# Patient Record
Sex: MALE | Race: WHITE | Employment: OTHER | URBAN - METROPOLITAN AREA
[De-identification: names, ages, dates, MRNs, and addresses within clinical notes are randomized per-mention and may not be internally consistent; named-entity substitution may affect disease eponyms.]

---

## 2018-12-25 ENCOUNTER — APPOINTMENT (OUTPATIENT)
Dept: GENERAL RADIOLOGY | Age: 58
DRG: 246 | End: 2018-12-25
Payer: MEDICARE

## 2018-12-25 ENCOUNTER — HOSPITAL ENCOUNTER (INPATIENT)
Age: 58
LOS: 1 days | Discharge: HOME | DRG: 246 | End: 2018-12-27
Attending: EMERGENCY MEDICINE | Admitting: INTERNAL MEDICINE
Payer: MEDICARE

## 2018-12-25 DIAGNOSIS — I10 ESSENTIAL HYPERTENSION: ICD-10-CM

## 2018-12-25 DIAGNOSIS — I21.4 NSTEMI (NON-ST ELEVATED MYOCARDIAL INFARCTION) (HCC): Primary | ICD-10-CM

## 2018-12-25 PROCEDURE — 83735 ASSAY OF MAGNESIUM: CPT

## 2018-12-25 PROCEDURE — 99285 EMERGENCY DEPT VISIT HI MDM: CPT

## 2018-12-25 PROCEDURE — 96374 THER/PROPH/DIAG INJ IV PUSH: CPT

## 2018-12-25 PROCEDURE — 80053 COMPREHEN METABOLIC PANEL: CPT

## 2018-12-25 PROCEDURE — 93005 ELECTROCARDIOGRAM TRACING: CPT | Performed by: EMERGENCY MEDICINE

## 2018-12-25 PROCEDURE — 71045 X-RAY EXAM CHEST 1 VIEW: CPT

## 2018-12-25 PROCEDURE — 6360000002 HC RX W HCPCS: Performed by: EMERGENCY MEDICINE

## 2018-12-25 PROCEDURE — 84484 ASSAY OF TROPONIN QUANT: CPT

## 2018-12-25 RX ORDER — HEPARIN SODIUM 10000 [USP'U]/100ML
INJECTION, SOLUTION INTRAVENOUS
Status: DISPENSED
Start: 2018-12-25 | End: 2018-12-26

## 2018-12-25 RX ORDER — HEPARIN SODIUM 1000 [USP'U]/ML
4000 INJECTION, SOLUTION INTRAVENOUS; SUBCUTANEOUS ONCE
Status: COMPLETED | OUTPATIENT
Start: 2018-12-25 | End: 2018-12-25

## 2018-12-25 RX ORDER — HEPARIN SODIUM 1000 [USP'U]/ML
4000 INJECTION, SOLUTION INTRAVENOUS; SUBCUTANEOUS PRN
Status: DISCONTINUED | OUTPATIENT
Start: 2018-12-25 | End: 2018-12-26

## 2018-12-25 RX ORDER — HEPARIN SODIUM 1000 [USP'U]/ML
INJECTION, SOLUTION INTRAVENOUS; SUBCUTANEOUS
Status: DISPENSED
Start: 2018-12-25 | End: 2018-12-26

## 2018-12-25 RX ORDER — HEPARIN SODIUM 1000 [USP'U]/ML
2000 INJECTION, SOLUTION INTRAVENOUS; SUBCUTANEOUS PRN
Status: DISCONTINUED | OUTPATIENT
Start: 2018-12-25 | End: 2018-12-26

## 2018-12-25 RX ORDER — HEPARIN SODIUM 10000 [USP'U]/100ML
12 INJECTION, SOLUTION INTRAVENOUS CONTINUOUS
Status: DISCONTINUED | OUTPATIENT
Start: 2018-12-25 | End: 2018-12-26

## 2018-12-25 RX ADMIN — HEPARIN SODIUM 4000 UNITS: 1000 INJECTION INTRAVENOUS; SUBCUTANEOUS at 23:51

## 2018-12-25 RX ADMIN — HEPARIN SODIUM AND DEXTROSE 12 UNITS/KG/HR: 10000; 5 INJECTION INTRAVENOUS at 23:50

## 2018-12-25 ASSESSMENT — PAIN SCALES - GENERAL: PAINLEVEL_OUTOF10: 8

## 2018-12-25 ASSESSMENT — PAIN DESCRIPTION - LOCATION: LOCATION: CHEST

## 2018-12-26 PROBLEM — I21.3 STEMI (ST ELEVATION MYOCARDIAL INFARCTION) (HCC): Status: ACTIVE | Noted: 2018-12-26

## 2018-12-26 LAB
A/G RATIO: 1.6 (ref 1.1–2.2)
ALBUMIN SERPL-MCNC: 4.1 G/DL (ref 3.4–5)
ALP BLD-CCNC: 115 U/L (ref 40–129)
ALT SERPL-CCNC: 67 U/L (ref 10–40)
ANION GAP SERPL CALCULATED.3IONS-SCNC: 11 MMOL/L (ref 3–16)
ANION GAP SERPL CALCULATED.3IONS-SCNC: 19 MMOL/L (ref 3–16)
AST SERPL-CCNC: 86 U/L (ref 15–37)
BILIRUB SERPL-MCNC: <0.2 MG/DL (ref 0–1)
BUN BLDV-MCNC: 7 MG/DL (ref 7–20)
BUN BLDV-MCNC: 7 MG/DL (ref 7–20)
CALCIUM SERPL-MCNC: 8.4 MG/DL (ref 8.3–10.6)
CALCIUM SERPL-MCNC: 8.7 MG/DL (ref 8.3–10.6)
CHLORIDE BLD-SCNC: 100 MMOL/L (ref 99–110)
CHLORIDE BLD-SCNC: 101 MMOL/L (ref 99–110)
CO2: 15 MMOL/L (ref 21–32)
CO2: 21 MMOL/L (ref 21–32)
CREAT SERPL-MCNC: 0.8 MG/DL (ref 0.8–1.3)
CREAT SERPL-MCNC: 0.9 MG/DL (ref 0.8–1.3)
GFR AFRICAN AMERICAN: >60
GFR AFRICAN AMERICAN: >60
GFR NON-AFRICAN AMERICAN: >60
GFR NON-AFRICAN AMERICAN: >60
GLOBULIN: 2.5 G/DL
GLUCOSE BLD-MCNC: 174 MG/DL (ref 70–99)
GLUCOSE BLD-MCNC: 176 MG/DL (ref 70–99)
HCT VFR BLD CALC: 37.8 % (ref 40.5–52.5)
HEMOGLOBIN: 12.6 G/DL (ref 13.5–17.5)
LEFT VENTRICULAR EJECTION FRACTION HIGH VALUE: 60 %
LEFT VENTRICULAR EJECTION FRACTION MODE: NORMAL
LEFT VENTRICULAR EJECTION FRACTION MODE: NORMAL
LV EF: 50 %
LV EF: 50 %
LV EF: 55 %
LV EF: 58 %
LVEF MODALITY: NORMAL
LVEF MODALITY: NORMAL
MAGNESIUM: 2 MG/DL (ref 1.8–2.4)
MCH RBC QN AUTO: 31.2 PG (ref 26–34)
MCHC RBC AUTO-ENTMCNC: 33.3 G/DL (ref 31–36)
MCV RBC AUTO: 93.9 FL (ref 80–100)
PDW BLD-RTO: 14 % (ref 12.4–15.4)
PLATELET # BLD: 280 K/UL (ref 135–450)
PMV BLD AUTO: 8.8 FL (ref 5–10.5)
POC ACT LR: 196 SEC
POC ACT LR: 314 SEC
POTASSIUM REFLEX MAGNESIUM: 2.8 MMOL/L (ref 3.5–5.1)
POTASSIUM SERPL-SCNC: 5.9 MMOL/L (ref 3.5–5.1)
RBC # BLD: 4.02 M/UL (ref 4.2–5.9)
SODIUM BLD-SCNC: 132 MMOL/L (ref 136–145)
SODIUM BLD-SCNC: 135 MMOL/L (ref 136–145)
TOTAL PROTEIN: 6.6 G/DL (ref 6.4–8.2)
TROPONIN: 0.89 NG/ML
TROPONIN: <0.01 NG/ML
WBC # BLD: 14 K/UL (ref 4–11)

## 2018-12-26 PROCEDURE — 93458 L HRT ARTERY/VENTRICLE ANGIO: CPT

## 2018-12-26 PROCEDURE — 2709999900 HC NON-CHARGEABLE SUPPLY

## 2018-12-26 PROCEDURE — 85027 COMPLETE CBC AUTOMATED: CPT

## 2018-12-26 PROCEDURE — 6360000002 HC RX W HCPCS

## 2018-12-26 PROCEDURE — 2580000003 HC RX 258: Performed by: INTERNAL MEDICINE

## 2018-12-26 PROCEDURE — C1725 CATH, TRANSLUMIN NON-LASER: HCPCS

## 2018-12-26 PROCEDURE — 99153 MOD SED SAME PHYS/QHP EA: CPT

## 2018-12-26 PROCEDURE — 94760 N-INVAS EAR/PLS OXIMETRY 1: CPT

## 2018-12-26 PROCEDURE — C1887 CATHETER, GUIDING: HCPCS

## 2018-12-26 PROCEDURE — C1769 GUIDE WIRE: HCPCS

## 2018-12-26 PROCEDURE — 6370000000 HC RX 637 (ALT 250 FOR IP)

## 2018-12-26 PROCEDURE — 80048 BASIC METABOLIC PNL TOTAL CA: CPT

## 2018-12-26 PROCEDURE — 2000000000 HC ICU R&B

## 2018-12-26 PROCEDURE — 93306 TTE W/DOPPLER COMPLETE: CPT

## 2018-12-26 PROCEDURE — 99152 MOD SED SAME PHYS/QHP 5/>YRS: CPT

## 2018-12-26 PROCEDURE — 99152 MOD SED SAME PHYS/QHP 5/>YRS: CPT | Performed by: INTERNAL MEDICINE

## 2018-12-26 PROCEDURE — 92941 PRQ TRLML REVSC TOT OCCL AMI: CPT

## 2018-12-26 PROCEDURE — B2111ZZ FLUOROSCOPY OF MULTIPLE CORONARY ARTERIES USING LOW OSMOLAR CONTRAST: ICD-10-PCS | Performed by: INTERNAL MEDICINE

## 2018-12-26 PROCEDURE — 6360000002 HC RX W HCPCS: Performed by: INTERNAL MEDICINE

## 2018-12-26 PROCEDURE — 4A023N7 MEASUREMENT OF CARDIAC SAMPLING AND PRESSURE, LEFT HEART, PERCUTANEOUS APPROACH: ICD-10-PCS | Performed by: INTERNAL MEDICINE

## 2018-12-26 PROCEDURE — 027034Z DILATION OF CORONARY ARTERY, ONE ARTERY WITH DRUG-ELUTING INTRALUMINAL DEVICE, PERCUTANEOUS APPROACH: ICD-10-PCS | Performed by: INTERNAL MEDICINE

## 2018-12-26 PROCEDURE — C1894 INTRO/SHEATH, NON-LASER: HCPCS

## 2018-12-26 PROCEDURE — 2500000003 HC RX 250 WO HCPCS

## 2018-12-26 PROCEDURE — 93458 L HRT ARTERY/VENTRICLE ANGIO: CPT | Performed by: INTERNAL MEDICINE

## 2018-12-26 PROCEDURE — 84484 ASSAY OF TROPONIN QUANT: CPT

## 2018-12-26 PROCEDURE — 36415 COLL VENOUS BLD VENIPUNCTURE: CPT

## 2018-12-26 PROCEDURE — 85347 COAGULATION TIME ACTIVATED: CPT

## 2018-12-26 PROCEDURE — 92941 PRQ TRLML REVSC TOT OCCL AMI: CPT | Performed by: INTERNAL MEDICINE

## 2018-12-26 PROCEDURE — 99291 CRITICAL CARE FIRST HOUR: CPT | Performed by: INTERNAL MEDICINE

## 2018-12-26 PROCEDURE — B2150ZZ FLUOROSCOPY OF LEFT HEART USING HIGH OSMOLAR CONTRAST: ICD-10-PCS | Performed by: INTERNAL MEDICINE

## 2018-12-26 PROCEDURE — C1874 STENT, COATED/COV W/DEL SYS: HCPCS

## 2018-12-26 PROCEDURE — 6370000000 HC RX 637 (ALT 250 FOR IP): Performed by: INTERNAL MEDICINE

## 2018-12-26 PROCEDURE — 6360000004 HC RX CONTRAST MEDICATION: Performed by: INTERNAL MEDICINE

## 2018-12-26 RX ORDER — POTASSIUM CHLORIDE 7.45 MG/ML
INJECTION INTRAVENOUS
Status: COMPLETED
Start: 2018-12-26 | End: 2018-12-26

## 2018-12-26 RX ORDER — MORPHINE SULFATE 2 MG/ML
2 INJECTION, SOLUTION INTRAMUSCULAR; INTRAVENOUS ONCE
Status: COMPLETED | OUTPATIENT
Start: 2018-12-26 | End: 2018-12-26

## 2018-12-26 RX ORDER — MORPHINE SULFATE 2 MG/ML
INJECTION, SOLUTION INTRAMUSCULAR; INTRAVENOUS
Status: COMPLETED
Start: 2018-12-26 | End: 2018-12-26

## 2018-12-26 RX ORDER — ASPIRIN 81 MG/1
81 TABLET, CHEWABLE ORAL DAILY
Status: DISCONTINUED | OUTPATIENT
Start: 2018-12-27 | End: 2018-12-27 | Stop reason: HOSPADM

## 2018-12-26 RX ORDER — SODIUM CHLORIDE 0.9 % (FLUSH) 0.9 %
SYRINGE (ML) INJECTION
Status: DISCONTINUED
Start: 2018-12-26 | End: 2018-12-26 | Stop reason: WASHOUT

## 2018-12-26 RX ORDER — POTASSIUM CHLORIDE 20 MEQ/1
40 TABLET, EXTENDED RELEASE ORAL 2 TIMES DAILY WITH MEALS
Status: DISCONTINUED | OUTPATIENT
Start: 2018-12-26 | End: 2018-12-26

## 2018-12-26 RX ORDER — SODIUM CHLORIDE 0.9 % (FLUSH) 0.9 %
10 SYRINGE (ML) INJECTION PRN
Status: DISCONTINUED | OUTPATIENT
Start: 2018-12-26 | End: 2018-12-27 | Stop reason: HOSPADM

## 2018-12-26 RX ORDER — POTASSIUM CHLORIDE 7.45 MG/ML
10 INJECTION INTRAVENOUS ONCE
Status: COMPLETED | OUTPATIENT
Start: 2018-12-26 | End: 2018-12-26

## 2018-12-26 RX ORDER — LORAZEPAM 2 MG/ML
2 INJECTION INTRAMUSCULAR ONCE
Status: DISCONTINUED | OUTPATIENT
Start: 2018-12-26 | End: 2018-12-27 | Stop reason: HOSPADM

## 2018-12-26 RX ORDER — SODIUM CHLORIDE 0.9 % (FLUSH) 0.9 %
10 SYRINGE (ML) INJECTION EVERY 12 HOURS SCHEDULED
Status: DISCONTINUED | OUTPATIENT
Start: 2018-12-26 | End: 2018-12-27 | Stop reason: HOSPADM

## 2018-12-26 RX ORDER — KETOROLAC TROMETHAMINE 30 MG/ML
30 INJECTION, SOLUTION INTRAMUSCULAR; INTRAVENOUS EVERY 6 HOURS PRN
Status: DISCONTINUED | OUTPATIENT
Start: 2018-12-26 | End: 2018-12-27 | Stop reason: HOSPADM

## 2018-12-26 RX ORDER — ACETAMINOPHEN 325 MG/1
650 TABLET ORAL EVERY 4 HOURS PRN
Status: DISCONTINUED | OUTPATIENT
Start: 2018-12-26 | End: 2018-12-27 | Stop reason: HOSPADM

## 2018-12-26 RX ADMIN — METOPROLOL TARTRATE 25 MG: 25 TABLET, FILM COATED ORAL at 09:17

## 2018-12-26 RX ADMIN — ENOXAPARIN SODIUM 40 MG: 40 INJECTION SUBCUTANEOUS at 10:54

## 2018-12-26 RX ADMIN — KETOROLAC TROMETHAMINE 30 MG: 30 INJECTION, SOLUTION INTRAMUSCULAR at 10:54

## 2018-12-26 RX ADMIN — IOPAMIDOL 153 ML: 755 INJECTION, SOLUTION INTRAVENOUS at 01:10

## 2018-12-26 RX ADMIN — ACETAMINOPHEN 650 MG: 325 TABLET, FILM COATED ORAL at 02:22

## 2018-12-26 RX ADMIN — TICAGRELOR 90 MG: 90 TABLET ORAL at 20:23

## 2018-12-26 RX ADMIN — POTASSIUM CHLORIDE 10 MEQ: 7.45 INJECTION INTRAVENOUS at 00:15

## 2018-12-26 RX ADMIN — MAGNESIUM HYDROXIDE 30 ML: 400 SUSPENSION ORAL at 09:14

## 2018-12-26 RX ADMIN — Medication 10 ML: at 20:23

## 2018-12-26 RX ADMIN — MORPHINE SULFATE 2 MG: 2 INJECTION, SOLUTION INTRAMUSCULAR; INTRAVENOUS at 02:50

## 2018-12-26 RX ADMIN — KETOROLAC TROMETHAMINE 30 MG: 30 INJECTION, SOLUTION INTRAMUSCULAR at 23:49

## 2018-12-26 RX ADMIN — METOPROLOL TARTRATE 25 MG: 25 TABLET, FILM COATED ORAL at 20:23

## 2018-12-26 RX ADMIN — Medication 10 ML: at 09:16

## 2018-12-26 RX ADMIN — KETOROLAC TROMETHAMINE 30 MG: 30 INJECTION, SOLUTION INTRAMUSCULAR at 17:51

## 2018-12-26 RX ADMIN — TICAGRELOR 90 MG: 90 TABLET ORAL at 09:17

## 2018-12-26 RX ADMIN — Medication 10 MEQ: at 00:15

## 2018-12-26 RX ADMIN — TICAGRELOR 90 MG: 90 TABLET ORAL at 01:58

## 2018-12-26 ASSESSMENT — PAIN SCALES - GENERAL
PAINLEVEL_OUTOF10: 7
PAINLEVEL_OUTOF10: 5
PAINLEVEL_OUTOF10: 6
PAINLEVEL_OUTOF10: 6
PAINLEVEL_OUTOF10: 3
PAINLEVEL_OUTOF10: 0
PAINLEVEL_OUTOF10: 6
PAINLEVEL_OUTOF10: 0
PAINLEVEL_OUTOF10: 8
PAINLEVEL_OUTOF10: 2
PAINLEVEL_OUTOF10: 0
PAINLEVEL_OUTOF10: 7

## 2018-12-26 ASSESSMENT — PAIN DESCRIPTION - LOCATION
LOCATION: STERNUM
LOCATION: RIB CAGE

## 2018-12-26 ASSESSMENT — PAIN DESCRIPTION - PAIN TYPE
TYPE: ACUTE PAIN
TYPE: ACUTE PAIN

## 2018-12-26 NOTE — CONSULTS
cath    [facility] [id number]    Thank you for allowing us to participate in the care of [facility]. Please call me with any questions [phone].     Jo Barnett MD, [facility]   Interventional Cardiologist  [facility]  [phone] [facility]  [phone] [address] [place] [address]  2018 1:15 AM

## 2018-12-27 VITALS
BODY MASS INDEX: 25.12 KG/M2 | HEIGHT: 67 IN | TEMPERATURE: 97.9 F | DIASTOLIC BLOOD PRESSURE: 65 MMHG | HEART RATE: 72 BPM | WEIGHT: 160.05 LBS | RESPIRATION RATE: 20 BRPM | SYSTOLIC BLOOD PRESSURE: 121 MMHG | OXYGEN SATURATION: 98 %

## 2018-12-27 LAB
ANION GAP SERPL CALCULATED.3IONS-SCNC: 11 MMOL/L (ref 3–16)
BUN BLDV-MCNC: 8 MG/DL (ref 7–20)
CALCIUM SERPL-MCNC: 8.4 MG/DL (ref 8.3–10.6)
CHLORIDE BLD-SCNC: 102 MMOL/L (ref 99–110)
CO2: 25 MMOL/L (ref 21–32)
CREAT SERPL-MCNC: 1 MG/DL (ref 0.8–1.3)
EKG ATRIAL RATE: 93 BPM
EKG DIAGNOSIS: NORMAL
EKG Q-T INTERVAL: 406 MS
EKG QRS DURATION: 134 MS
EKG QTC CALCULATION (BAZETT): 468 MS
EKG R AXIS: 78 DEGREES
EKG T AXIS: 2 DEGREES
EKG VENTRICULAR RATE: 80 BPM
GFR AFRICAN AMERICAN: >60
GFR NON-AFRICAN AMERICAN: >60
GLUCOSE BLD-MCNC: 103 MG/DL (ref 70–99)
POTASSIUM SERPL-SCNC: 3.8 MMOL/L (ref 3.5–5.1)
SODIUM BLD-SCNC: 138 MMOL/L (ref 136–145)

## 2018-12-27 PROCEDURE — 2580000003 HC RX 258: Performed by: INTERNAL MEDICINE

## 2018-12-27 PROCEDURE — 80048 BASIC METABOLIC PNL TOTAL CA: CPT

## 2018-12-27 PROCEDURE — 99232 SBSQ HOSP IP/OBS MODERATE 35: CPT | Performed by: INTERNAL MEDICINE

## 2018-12-27 PROCEDURE — 6370000000 HC RX 637 (ALT 250 FOR IP): Performed by: INTERNAL MEDICINE

## 2018-12-27 PROCEDURE — 6360000002 HC RX W HCPCS: Performed by: INTERNAL MEDICINE

## 2018-12-27 RX ORDER — ATORVASTATIN CALCIUM 80 MG/1
80 TABLET, FILM COATED ORAL NIGHTLY
Status: DISCONTINUED | OUTPATIENT
Start: 2018-12-27 | End: 2018-12-27

## 2018-12-27 RX ORDER — POTASSIUM CHLORIDE 20 MEQ/1
40 TABLET, EXTENDED RELEASE ORAL ONCE
Status: COMPLETED | OUTPATIENT
Start: 2018-12-27 | End: 2018-12-27

## 2018-12-27 RX ORDER — ATORVASTATIN CALCIUM 80 MG/1
80 TABLET, FILM COATED ORAL NIGHTLY
Status: DISCONTINUED | OUTPATIENT
Start: 2018-12-27 | End: 2018-12-27 | Stop reason: HOSPADM

## 2018-12-27 RX ORDER — ROSUVASTATIN CALCIUM 10 MG/1
20 TABLET, COATED ORAL NIGHTLY
Status: DISCONTINUED | OUTPATIENT
Start: 2018-12-27 | End: 2018-12-27

## 2018-12-27 RX ORDER — ATORVASTATIN CALCIUM 80 MG/1
80 TABLET, FILM COATED ORAL NIGHTLY
Qty: 90 TABLET | Refills: 3 | Status: SHIPPED | OUTPATIENT
Start: 2018-12-27 | End: 2019-01-04 | Stop reason: SINTOL

## 2018-12-27 RX ORDER — ACETAMINOPHEN 80 MG
TABLET,CHEWABLE ORAL
Status: DISCONTINUED
Start: 2018-12-27 | End: 2018-12-27 | Stop reason: HOSPADM

## 2018-12-27 RX ORDER — LOSARTAN POTASSIUM 25 MG/1
25 TABLET ORAL DAILY
Qty: 90 TABLET | Refills: 3 | Status: SHIPPED | OUTPATIENT
Start: 2018-12-27 | End: 2020-01-03

## 2018-12-27 RX ORDER — LOSARTAN POTASSIUM 25 MG/1
25 TABLET ORAL DAILY
Status: DISCONTINUED | OUTPATIENT
Start: 2018-12-27 | End: 2018-12-27 | Stop reason: HOSPADM

## 2018-12-27 RX ORDER — ASPIRIN 81 MG/1
81 TABLET, CHEWABLE ORAL DAILY
Qty: 90 TABLET | Refills: 3 | Status: SHIPPED | OUTPATIENT
Start: 2018-12-28

## 2018-12-27 RX ORDER — LISINOPRIL 5 MG/1
2.5 TABLET ORAL DAILY
Status: DISCONTINUED | OUTPATIENT
Start: 2018-12-27 | End: 2018-12-27

## 2018-12-27 RX ADMIN — POTASSIUM CHLORIDE 40 MEQ: 1500 TABLET, EXTENDED RELEASE ORAL at 09:30

## 2018-12-27 RX ADMIN — LOSARTAN POTASSIUM 25 MG: 25 TABLET, FILM COATED ORAL at 09:30

## 2018-12-27 RX ADMIN — Medication 10 ML: at 05:20

## 2018-12-27 RX ADMIN — KETOROLAC TROMETHAMINE 30 MG: 30 INJECTION, SOLUTION INTRAMUSCULAR at 05:20

## 2018-12-27 RX ADMIN — TICAGRELOR 90 MG: 90 TABLET ORAL at 08:10

## 2018-12-27 RX ADMIN — Medication 10 ML: at 08:11

## 2018-12-27 RX ADMIN — ASPIRIN 81 MG 81 MG: 81 TABLET ORAL at 08:19

## 2018-12-27 RX ADMIN — METOPROLOL TARTRATE 25 MG: 25 TABLET, FILM COATED ORAL at 08:10

## 2018-12-27 ASSESSMENT — PAIN SCALES - GENERAL
PAINLEVEL_OUTOF10: 7
PAINLEVEL_OUTOF10: 0
PAINLEVEL_OUTOF10: 2
PAINLEVEL_OUTOF10: 0

## 2018-12-27 NOTE — PROGRESS NOTES
2 cc of air removed
CLINICAL PHARMACY NOTE: MEDS TO [address] Select Patient?: No  Total # of Prescriptions Filled: 5   The following medications were delivered to the patient:  · Aspirin 81 chewable  · Atorvastatin 80  · Losartan 25  · Metoprolol tartrate 25  · Brilinta 90  Total # of Interventions Completed: 3  Time Spent (min): 60    Additional Documentation:  Spoke with nurse--fill 15 day script Brilinta, give back 90 day supply at delivery. Change in qty for Brilinta. Spoke with patient--okay to take atorvastatin despite allergy on file.  Patient signed for prescriptions
Discharge instructions reviewed with patient and family member. Patient and family verbalized understanding. All home medications have been reviewed, questions answered and patient voiced understanding. All medication side effects reviewed and patient and family verbalized understanding. Patient given prescriptions, discharge instructions, and appointment times. Patient discharged to home with family via private car. Taken to lobby via wheelchair. Follow up appointment(s) reviewed with patient and all attempts made to schedule within 7 days of discharge. [facility]  Depression Screen    1. During the past month, have you often been bothered by feeling down, depressed, or hopeless? NO    2. During the past month, have you often been bothered by little interest or pleasure in       doing things?    NO
H/p dictation id [id number]. Date of service 12/26/18. Stemi. Htn. Tobacco dependence.
PRN Lew Hines MD        metoprolol tartrate (LOPRESSOR) tablet 25 mg  25 mg Oral BID Lew Hines MD        [START ON 12/27/2018] aspirin chewable tablet 81 mg  81 mg Oral Daily Lew Hines MD        [facility]) tablet 90 mg  90 mg Oral BID Lew Hines MD   90 mg at 12/26/18 0158    [START ON 12/27/2018] influenza quadrivalent split vaccine (FLUZONE;FLUARIX;FLULAVAL;AFLURIA) injection 0.5 mL  0.5 mL Intramuscular Once Lew Hines MD        heparin (porcine) injection 4,000 Units  4,000 Units Intravenous PRN Vasu Cheng MD        heparin (porcine) injection 2,000 Units  2,000 Units Intravenous PRN Vasu Cheng MD        heparin 25,000 units in dextrose 5% 250 mL infusion  12 Units/kg/hr Intravenous Continuous Vasu Cheng MD 9.3 mL/hr at 12/25/18 2350 12 Units/kg/hr at 12/25/18 2350    amiodarone (CORDARONE) 450 mg in dextrose 5 % 250 mL infusion  1 mg/min Intravenous Continuous Vasu Cheng MD               Pre-Sedation:    Pre-Sedation Documentation and Exam:  I have personally completed a history, physical exam & review of systems for this patient (see notes). Prior History of Anesthesia Complications:   none    Modified Mallampati:  II (soft palate, uvula, fauces visible)    ASA Classification:  Class 4 - A patient with an incapacitating systemic disease that is a constant threat to life and Class 2 -- A normal healthy patient with mild systemic disease    Mylene Scale: Activity:  1 - Able to move 2 extremities voluntarily on command  Respiration:  1-dyspneic  Circulation:  2 - BP+/- 20mmHg of normal  Consciousness:  1 - Arousable on calling  Oxygen Saturation (color):  1 - Needs oxygen to maintain oxygen saturation >90%    Sedation/Anesthesia Plan:  Guard the patient's safety and welfare. Minimize physical discomfort and pain.   Minimize negative psychological responses to treatment by providing sedation and analgesia and maximize the potential

## 2018-12-27 NOTE — DISCHARGE INSTR - OTHER ORDERS
pressure for 10 min  · Feeling lightheaded or dizzy  · Increased swelling or bruising of the groin or leg  · Unusual pain, numbness or tingling at the hand or down the arm  · Any signs of infection ( redness, yellow drainage, swelling, pain, fever)  · Unusual swelling of lower legs/feet, chest discomfort, unusual weakness or fatigue

## 2018-12-27 NOTE — H&P
[facility]                     [address] [facility], [address]                              HISTORY AND PHYSICAL    PATIENT NAME: Ursula Pereyra                   :        1960  MED REC NO:   [id number]                          ROOM:       2909  ACCOUNT NO:   [de-identified]                           ADMIT DATE: 2018  PROVIDER:     Jake Maria MD    DATE OF SERVICE:  I obtained the history and performed a physical exam  on the patient in the CVICU on 2018. CHIEF COMPLAINT:  Chest pain. HISTORY OF PRESENT ILLNESS:  The patient is a 78-year-old   female presenting to the hospital with chief complaints of sudden onset  of chest pain that started when the patient was having sex, midway  through his having sex he started having severe crushing substernal  chest pain for which he called EMS and he became unresponsive and was  found to be in ventricular fibrillation for which CPR was started, he  was given defibrillation and amiodarone and brought to the hospital.  In  the hospital, the patient underwent an emergency coronary angiography  with stenting of the RCA. The interventional cardiologist Colette Ramírez  requested an admission to the hospitalist service as  the primary admitting service. The patient at the time of my exam still  is complaining of some soreness on his chest because of primarily the  CPR. PAST MEDICAL/PAST SURGICAL HISTORY:  1. Hypertension. 2.  The patient has had multiple cerebrovascular accidents in the past  according to him which has left him with some problems with speech. ALLERGIC HISTORY:  [place] Query. FAMILY HISTORY:  Reviewed by me and is currently noncontributory. SOCIAL HISTORY:  Lives at home. Smokes cigarettes. No illicit  substance use. MEDICATIONS:  The patient's home medication list was reviewed by me. The patient's home medications include Norvasc, Prozac and Maxzide.     REVIEW

## 2019-01-04 ENCOUNTER — OFFICE VISIT (OUTPATIENT)
Dept: CARDIOLOGY CLINIC | Age: 59
End: 2019-01-04
Payer: MEDICARE

## 2019-01-04 VITALS
BODY MASS INDEX: 24.86 KG/M2 | DIASTOLIC BLOOD PRESSURE: 70 MMHG | HEART RATE: 64 BPM | WEIGHT: 164 LBS | SYSTOLIC BLOOD PRESSURE: 136 MMHG | HEIGHT: 68 IN

## 2019-01-04 DIAGNOSIS — I10 ESSENTIAL HYPERTENSION: ICD-10-CM

## 2019-01-04 DIAGNOSIS — F17.200 SMOKING: ICD-10-CM

## 2019-01-04 DIAGNOSIS — E78.2 MIXED HYPERLIPIDEMIA: ICD-10-CM

## 2019-01-04 DIAGNOSIS — I25.10 CORONARY ARTERY DISEASE DUE TO LIPID RICH PLAQUE: Primary | ICD-10-CM

## 2019-01-04 DIAGNOSIS — I25.83 CORONARY ARTERY DISEASE DUE TO LIPID RICH PLAQUE: Primary | ICD-10-CM

## 2019-01-04 PROCEDURE — 99214 OFFICE O/P EST MOD 30 MIN: CPT | Performed by: NURSE PRACTITIONER

## 2019-01-04 RX ORDER — ROSUVASTATIN CALCIUM 10 MG/1
10 TABLET, COATED ORAL DAILY
Qty: 90 TABLET | Refills: 5 | Status: SHIPPED | OUTPATIENT
Start: 2019-01-04 | End: 2020-01-31 | Stop reason: SINTOL

## 2019-01-04 RX ORDER — ROSUVASTATIN CALCIUM 10 MG/1
10 TABLET, COATED ORAL DAILY
Qty: 30 TABLET | Refills: 5 | Status: SHIPPED | OUTPATIENT
Start: 2019-01-04 | End: 2019-01-04 | Stop reason: SDUPTHER

## 2019-01-14 ENCOUNTER — HOSPITAL ENCOUNTER (OUTPATIENT)
Dept: NON INVASIVE DIAGNOSTICS | Age: 59
Discharge: HOME | End: 2019-01-14
Payer: MEDICARE

## 2019-01-14 DIAGNOSIS — I25.10 CORONARY ARTERY DISEASE DUE TO LIPID RICH PLAQUE: ICD-10-CM

## 2019-01-14 DIAGNOSIS — I25.83 CORONARY ARTERY DISEASE DUE TO LIPID RICH PLAQUE: ICD-10-CM

## 2019-01-14 PROCEDURE — 93017 CV STRESS TEST TRACING ONLY: CPT | Performed by: INTERNAL MEDICINE

## 2019-01-21 ENCOUNTER — HOSPITAL ENCOUNTER (OUTPATIENT)
Age: 59
Discharge: HOME | End: 2019-01-21
Payer: MEDICARE

## 2019-01-21 DIAGNOSIS — I25.83 CORONARY ARTERY DISEASE DUE TO LIPID RICH PLAQUE: ICD-10-CM

## 2019-01-21 DIAGNOSIS — I25.10 CORONARY ARTERY DISEASE DUE TO LIPID RICH PLAQUE: ICD-10-CM

## 2019-01-21 LAB
ANION GAP SERPL CALCULATED.3IONS-SCNC: 14 MMOL/L (ref 3–16)
BUN BLDV-MCNC: 12 MG/DL (ref 7–20)
CALCIUM SERPL-MCNC: 9.5 MG/DL (ref 8.3–10.6)
CHLORIDE BLD-SCNC: 105 MMOL/L (ref 99–110)
CO2: 22 MMOL/L (ref 21–32)
CREAT SERPL-MCNC: 0.9 MG/DL (ref 0.8–1.3)
GFR AFRICAN AMERICAN: >60
GFR NON-AFRICAN AMERICAN: >60
GLUCOSE BLD-MCNC: 116 MG/DL (ref 70–99)
POTASSIUM SERPL-SCNC: 5 MMOL/L (ref 3.5–5.1)
SODIUM BLD-SCNC: 141 MMOL/L (ref 136–145)

## 2019-01-21 PROCEDURE — 36415 COLL VENOUS BLD VENIPUNCTURE: CPT

## 2019-01-21 PROCEDURE — 80048 BASIC METABOLIC PNL TOTAL CA: CPT

## 2019-01-22 ENCOUNTER — TELEPHONE (OUTPATIENT)
Dept: CARDIOLOGY CLINIC | Age: 59
End: 2019-01-22

## 2019-01-29 ENCOUNTER — HOSPITAL ENCOUNTER (OUTPATIENT)
Dept: CARDIAC REHAB | Age: 59
Setting detail: THERAPIES SERIES
Discharge: HOME | End: 2019-01-29
Payer: MEDICARE

## 2019-02-04 ENCOUNTER — HOSPITAL ENCOUNTER (OUTPATIENT)
Dept: CARDIAC REHAB | Age: 59
Setting detail: THERAPIES SERIES
Discharge: HOME | End: 2019-02-04
Payer: MEDICARE

## 2019-02-04 PROCEDURE — 93798 PHYS/QHP OP CAR RHAB W/ECG: CPT

## 2019-02-06 ENCOUNTER — HOSPITAL ENCOUNTER (OUTPATIENT)
Dept: CARDIAC REHAB | Age: 59
Setting detail: THERAPIES SERIES
End: 2019-02-06
Payer: MEDICARE

## 2019-02-08 ENCOUNTER — HOSPITAL ENCOUNTER (OUTPATIENT)
Dept: CARDIAC REHAB | Age: 59
Setting detail: THERAPIES SERIES
Discharge: HOME | End: 2019-02-08
Payer: MEDICARE

## 2019-02-08 PROCEDURE — 93798 PHYS/QHP OP CAR RHAB W/ECG: CPT

## 2019-02-11 ENCOUNTER — HOSPITAL ENCOUNTER (OUTPATIENT)
Dept: CARDIAC REHAB | Age: 59
Setting detail: THERAPIES SERIES
End: 2019-02-11
Payer: MEDICARE

## 2019-02-13 ENCOUNTER — HOSPITAL ENCOUNTER (OUTPATIENT)
Dept: CARDIAC REHAB | Age: 59
Setting detail: THERAPIES SERIES
Discharge: HOME | End: 2019-02-13
Payer: MEDICARE

## 2019-02-13 PROCEDURE — 93798 PHYS/QHP OP CAR RHAB W/ECG: CPT

## 2019-02-15 ENCOUNTER — HOSPITAL ENCOUNTER (OUTPATIENT)
Dept: CARDIAC REHAB | Age: 59
Setting detail: THERAPIES SERIES
End: 2019-02-15
Payer: MEDICARE

## 2019-02-18 ENCOUNTER — HOSPITAL ENCOUNTER (OUTPATIENT)
Dept: CARDIAC REHAB | Age: 59
Setting detail: THERAPIES SERIES
End: 2019-02-18
Payer: MEDICARE

## 2019-02-20 ENCOUNTER — HOSPITAL ENCOUNTER (OUTPATIENT)
Dept: CARDIAC REHAB | Age: 59
Setting detail: THERAPIES SERIES
Discharge: HOME | End: 2019-02-20
Payer: MEDICARE

## 2019-02-20 PROCEDURE — 93798 PHYS/QHP OP CAR RHAB W/ECG: CPT

## 2019-02-22 ENCOUNTER — HOSPITAL ENCOUNTER (OUTPATIENT)
Dept: CARDIAC REHAB | Age: 59
Setting detail: THERAPIES SERIES
Discharge: HOME | End: 2019-02-22
Payer: MEDICARE

## 2019-02-22 PROCEDURE — 93798 PHYS/QHP OP CAR RHAB W/ECG: CPT

## 2019-02-25 ENCOUNTER — HOSPITAL ENCOUNTER (OUTPATIENT)
Dept: CARDIAC REHAB | Age: 59
Setting detail: THERAPIES SERIES
End: 2019-02-25
Payer: MEDICARE

## 2019-02-26 ENCOUNTER — TELEPHONE (OUTPATIENT)
Dept: CARDIOLOGY CLINIC | Age: 59
End: 2019-02-26

## 2019-02-27 ENCOUNTER — HOSPITAL ENCOUNTER (OUTPATIENT)
Dept: CARDIAC REHAB | Age: 59
Setting detail: THERAPIES SERIES
Discharge: HOME | End: 2019-02-27
Payer: MEDICARE

## 2019-02-27 PROCEDURE — 93798 PHYS/QHP OP CAR RHAB W/ECG: CPT

## 2019-03-01 ENCOUNTER — HOSPITAL ENCOUNTER (OUTPATIENT)
Dept: CARDIAC REHAB | Age: 59
Setting detail: THERAPIES SERIES
Discharge: HOME | End: 2019-03-01
Payer: MEDICARE

## 2019-03-01 PROCEDURE — 93798 PHYS/QHP OP CAR RHAB W/ECG: CPT

## 2019-03-04 ENCOUNTER — HOSPITAL ENCOUNTER (OUTPATIENT)
Dept: CARDIAC REHAB | Age: 59
Setting detail: THERAPIES SERIES
End: 2019-03-04
Payer: MEDICARE

## 2019-03-06 ENCOUNTER — APPOINTMENT (OUTPATIENT)
Dept: CARDIAC REHAB | Age: 59
End: 2019-03-06
Payer: MEDICARE

## 2019-03-08 ENCOUNTER — HOSPITAL ENCOUNTER (OUTPATIENT)
Dept: CARDIAC REHAB | Age: 59
Setting detail: THERAPIES SERIES
End: 2019-03-08
Payer: MEDICARE

## 2019-03-11 ENCOUNTER — HOSPITAL ENCOUNTER (OUTPATIENT)
Dept: CARDIAC REHAB | Age: 59
Setting detail: THERAPIES SERIES
Discharge: HOME | End: 2019-03-11
Payer: MEDICARE

## 2019-03-11 PROCEDURE — 93798 PHYS/QHP OP CAR RHAB W/ECG: CPT

## 2019-03-13 ENCOUNTER — HOSPITAL ENCOUNTER (OUTPATIENT)
Dept: CARDIAC REHAB | Age: 59
Setting detail: THERAPIES SERIES
Discharge: HOME | End: 2019-03-13
Payer: MEDICARE

## 2019-03-13 PROCEDURE — 93798 PHYS/QHP OP CAR RHAB W/ECG: CPT

## 2019-03-15 ENCOUNTER — HOSPITAL ENCOUNTER (OUTPATIENT)
Dept: CARDIAC REHAB | Age: 59
Setting detail: THERAPIES SERIES
End: 2019-03-15
Payer: MEDICARE

## 2019-03-18 ENCOUNTER — HOSPITAL ENCOUNTER (OUTPATIENT)
Dept: CARDIAC REHAB | Age: 59
Setting detail: THERAPIES SERIES
Discharge: HOME | End: 2019-03-18
Payer: MEDICARE

## 2019-03-18 PROCEDURE — G0239 OTH RESP PROC, GROUP: HCPCS

## 2019-03-18 PROCEDURE — 93798 PHYS/QHP OP CAR RHAB W/ECG: CPT

## 2019-03-20 ENCOUNTER — HOSPITAL ENCOUNTER (OUTPATIENT)
Dept: CARDIAC REHAB | Age: 59
Setting detail: THERAPIES SERIES
Discharge: HOME | End: 2019-03-20
Payer: MEDICARE

## 2019-03-20 PROCEDURE — 93798 PHYS/QHP OP CAR RHAB W/ECG: CPT

## 2019-03-22 ENCOUNTER — HOSPITAL ENCOUNTER (OUTPATIENT)
Dept: CARDIAC REHAB | Age: 59
Setting detail: THERAPIES SERIES
Discharge: HOME | End: 2019-03-22
Payer: MEDICARE

## 2019-03-22 PROCEDURE — 93798 PHYS/QHP OP CAR RHAB W/ECG: CPT

## 2019-03-25 ENCOUNTER — HOSPITAL ENCOUNTER (OUTPATIENT)
Dept: CARDIAC REHAB | Age: 59
Setting detail: THERAPIES SERIES
Discharge: HOME | End: 2019-03-25
Payer: MEDICARE

## 2019-03-25 PROCEDURE — 93798 PHYS/QHP OP CAR RHAB W/ECG: CPT

## 2019-03-26 ENCOUNTER — HOSPITAL ENCOUNTER (OUTPATIENT)
Age: 59
Discharge: HOME | End: 2019-03-26
Payer: MEDICARE

## 2019-03-26 DIAGNOSIS — E78.2 MIXED HYPERLIPIDEMIA: ICD-10-CM

## 2019-03-26 LAB
ALT SERPL-CCNC: 48 U/L (ref 10–40)
ANION GAP SERPL CALCULATED.3IONS-SCNC: 14 MMOL/L (ref 3–16)
AST SERPL-CCNC: 44 U/L (ref 15–37)
BUN BLDV-MCNC: 13 MG/DL (ref 7–20)
CALCIUM SERPL-MCNC: 9.1 MG/DL (ref 8.3–10.6)
CHLORIDE BLD-SCNC: 103 MMOL/L (ref 99–110)
CHOLESTEROL, TOTAL: 148 MG/DL (ref 0–199)
CO2: 23 MMOL/L (ref 21–32)
CREAT SERPL-MCNC: 1 MG/DL (ref 0.8–1.3)
GFR AFRICAN AMERICAN: >60
GFR NON-AFRICAN AMERICAN: >60
GLUCOSE BLD-MCNC: 100 MG/DL (ref 70–99)
HDLC SERPL-MCNC: 76 MG/DL (ref 40–60)
LDL CHOLESTEROL CALCULATED: 23 MG/DL
POTASSIUM SERPL-SCNC: 5 MMOL/L (ref 3.5–5.1)
SODIUM BLD-SCNC: 140 MMOL/L (ref 136–145)
TRIGL SERPL-MCNC: 243 MG/DL (ref 0–150)
VLDLC SERPL CALC-MCNC: 49 MG/DL

## 2019-03-26 PROCEDURE — 84460 ALANINE AMINO (ALT) (SGPT): CPT

## 2019-03-26 PROCEDURE — 80061 LIPID PANEL: CPT

## 2019-03-26 PROCEDURE — 84450 TRANSFERASE (AST) (SGOT): CPT

## 2019-03-26 PROCEDURE — 80048 BASIC METABOLIC PNL TOTAL CA: CPT

## 2019-03-26 PROCEDURE — 36415 COLL VENOUS BLD VENIPUNCTURE: CPT

## 2019-03-27 ENCOUNTER — HOSPITAL ENCOUNTER (OUTPATIENT)
Dept: CARDIAC REHAB | Age: 59
Setting detail: THERAPIES SERIES
Discharge: HOME | End: 2019-03-27
Payer: MEDICARE

## 2019-03-27 PROCEDURE — 93798 PHYS/QHP OP CAR RHAB W/ECG: CPT

## 2019-03-28 ENCOUNTER — TELEPHONE (OUTPATIENT)
Dept: CARDIOLOGY CLINIC | Age: 59
End: 2019-03-28

## 2019-03-28 DIAGNOSIS — I25.83 CORONARY ARTERY DISEASE DUE TO LIPID RICH PLAQUE: ICD-10-CM

## 2019-03-28 DIAGNOSIS — E78.2 MIXED HYPERLIPIDEMIA: Primary | ICD-10-CM

## 2019-03-28 DIAGNOSIS — I25.10 CORONARY ARTERY DISEASE DUE TO LIPID RICH PLAQUE: ICD-10-CM

## 2019-03-28 DIAGNOSIS — I10 ESSENTIAL HYPERTENSION: ICD-10-CM

## 2019-03-29 ENCOUNTER — HOSPITAL ENCOUNTER (OUTPATIENT)
Dept: CARDIAC REHAB | Age: 59
Setting detail: THERAPIES SERIES
End: 2019-03-29
Payer: MEDICARE

## 2019-04-01 ENCOUNTER — HOSPITAL ENCOUNTER (OUTPATIENT)
Dept: CARDIAC REHAB | Age: 59
Setting detail: THERAPIES SERIES
Discharge: HOME | End: 2019-04-01
Payer: MEDICARE

## 2019-04-01 PROCEDURE — 93798 PHYS/QHP OP CAR RHAB W/ECG: CPT

## 2019-04-03 ENCOUNTER — HOSPITAL ENCOUNTER (OUTPATIENT)
Dept: CARDIAC REHAB | Age: 59
Setting detail: THERAPIES SERIES
Discharge: HOME | End: 2019-04-03
Payer: MEDICARE

## 2019-04-03 PROCEDURE — 93798 PHYS/QHP OP CAR RHAB W/ECG: CPT

## 2019-04-05 ENCOUNTER — HOSPITAL ENCOUNTER (OUTPATIENT)
Dept: CARDIAC REHAB | Age: 59
Setting detail: THERAPIES SERIES
End: 2019-04-05
Payer: MEDICARE

## 2019-04-08 ENCOUNTER — HOSPITAL ENCOUNTER (OUTPATIENT)
Dept: CARDIAC REHAB | Age: 59
Setting detail: THERAPIES SERIES
End: 2019-04-08
Payer: MEDICARE

## 2019-04-10 ENCOUNTER — HOSPITAL ENCOUNTER (OUTPATIENT)
Dept: CARDIAC REHAB | Age: 59
Setting detail: THERAPIES SERIES
End: 2019-04-10
Payer: MEDICARE

## 2019-04-12 ENCOUNTER — HOSPITAL ENCOUNTER (OUTPATIENT)
Dept: CARDIAC REHAB | Age: 59
Setting detail: THERAPIES SERIES
Discharge: HOME | End: 2019-04-12
Payer: MEDICARE

## 2019-04-12 PROCEDURE — 93798 PHYS/QHP OP CAR RHAB W/ECG: CPT

## 2019-04-15 ENCOUNTER — OFFICE VISIT (OUTPATIENT)
Dept: CARDIOLOGY CLINIC | Age: 59
End: 2019-04-15
Payer: MEDICARE

## 2019-04-15 ENCOUNTER — HOSPITAL ENCOUNTER (OUTPATIENT)
Dept: CARDIAC REHAB | Age: 59
Setting detail: THERAPIES SERIES
End: 2019-04-15
Payer: MEDICARE

## 2019-04-15 VITALS
SYSTOLIC BLOOD PRESSURE: 139 MMHG | WEIGHT: 157 LBS | BODY MASS INDEX: 23.79 KG/M2 | DIASTOLIC BLOOD PRESSURE: 75 MMHG | HEIGHT: 68 IN | HEART RATE: 68 BPM

## 2019-04-15 DIAGNOSIS — I10 ESSENTIAL HYPERTENSION: ICD-10-CM

## 2019-04-15 DIAGNOSIS — I25.83 CORONARY ARTERY DISEASE DUE TO LIPID RICH PLAQUE: Primary | ICD-10-CM

## 2019-04-15 DIAGNOSIS — F17.200 SMOKING: ICD-10-CM

## 2019-04-15 DIAGNOSIS — E78.2 MIXED HYPERLIPIDEMIA: ICD-10-CM

## 2019-04-15 DIAGNOSIS — I25.10 CORONARY ARTERY DISEASE DUE TO LIPID RICH PLAQUE: Primary | ICD-10-CM

## 2019-04-15 PROCEDURE — 99214 OFFICE O/P EST MOD 30 MIN: CPT | Performed by: INTERNAL MEDICINE

## 2019-04-15 NOTE — PROGRESS NOTES
[facility]  Cardiac Consult     Referring Provider:  Basilio Mckenzie MD     Chief Complaint   Patient presents with    Coronary Artery Disease        History of Present Illness:  62 y.o. male with cardiac arrest with inferior MI 2018 who presents for f/u. PCI on RCA with non obstructive disease of other vessels. GXT non-diagnostic due to HR. He has been having issues with depression, anxiety and vomiting. He felt it may be exacerbated by the lopressor so stopped it. However, he feel bad at rehab off of it. Restarted 1/4 dose. Depression improved. He says he quit smoking cigarettes. Past Medical History:   has a past medical history of Cerebral artery occlusion with cerebral infarction [facility]), Croup in pediatric patient, Hyperlipidemia, and Hypertension. Surgical History:   has a past surgical history that includes hernia repair. Social History:  Social History     Tobacco Use    Smoking status: Former Smoker     Packs/day: 1.00     Types: Cigarettes     Last attempt to quit: 2018     Years since quittin.3    Smokeless tobacco: Never Used    Tobacco comment: using electronic cigarette as of 12   Substance Use Topics    Alcohol use: Yes     Comment: rarely        Family History:  family history includes Cancer in his mother; Heart Disease in his father. Allergies:  Lipitor [atorvastatin]; Pentazocine; Sulfa antibiotics; Sulfur; and Viagra [sildenafil citrate]     Home Medications:  Prior to Visit Medications    Medication Sig Taking?  Authorizing Provider   ticagrelor (BRILINTA) 90 MG TABS tablet Take 1 tablet by mouth 2 times daily Yes Connie Melton MD   rosuvastatin (CRESTOR) 10 MG tablet TAKE 1 TABLET BY MOUTH DAILY  Patient taking differently: Take 5 mg by mouth daily  Yes ERIKA Cuellar - CNP   aspirin 81 MG chewable tablet Take 1 tablet by mouth daily Yes oCnnie Mleton MD   losartan (COZAAR) 25 MG tablet Take 1 tablet by mouth daily Yes Vicente Vega Sabrina Ivey MD   FLUoxetine (PROZAC) 40 MG capsule Take 40 mg by mouth daily. Yes Historical Provider, MD   metoprolol tartrate (LOPRESSOR) 25 MG tablet Take 1 tablet by mouth 2 times daily  Haylee Delgadillo MD   CHANTIX STARTING MONTH SHASTA 0.5 MG X 11 & 1 MG X 42 tablet   Historical Provider, MD   clonazePAM (KLONOPIN) 1 MG tablet 1 mg as needed. Historical Provider, MD       [x] Medications and dosages reviewed. ROS:  [x]Full ROS obtained and negative except as mentioned in HPI      Physical Examination:    Vitals:    04/15/19 1311   BP: 139/75   Site: Right Upper Arm   Position: Sitting   Cuff Size: Medium Adult   Pulse: 68   Weight: 157 lb (71.2 kg)   Height: 5' 8\" (1.727 m)        · GENERAL: Well developed, well nourished, No acute distress  · NEUROLOGICAL: Alert and oriented  · PSYCH: Anxious affect  · SKIN: Warm and dry, No visible rash,   · EYES: Pupils equal and round, Sclera non-icteric,   · HENT:  External ears and nose unremarkable, mucus membranes moist  · MUSCULOSKELETAL: Normal cephalic, neck supple  · CAROTID: Normal upstroke, no bruits  · CARDIAC: JVP normal, Normal PMI, regular rate and rhythm, normal S1S2, no murmur, rub, or gallop  · RESPIRATORY: Normal respiratory effort, clear to auscultation bilaterally  · EXTREMITIES: No LE edema  · GASTROINTESTINAL: normal bowel sounds, soft, non-tender, No hepatomegaly       Assessment:     CAD:  Inferior MI with arrest 12/2018. PCI RCA- Non obstructive other vessels. ECHO 12/2018 EF=60% with inferior HK  Med Rx. Continue rehab    HTN:  /75 (Site: Right Upper Arm, Position: Sitting, Cuff Size: Medium Adult)   Pulse 68   Ht 5' 8\" (1.727 m)   Wt 157 lb (71.2 kg)   BMI 23.87 kg/m²   Controlled. Same meds    Hypelipidemia:  On crestor LDL=23, now taking 1/2    Smoking:  Says he quit with MI  Smells of smoke. May be from wife    Depression:  Was worsened by higher lopressor dose. Seems improved with lower dose.   Needs to discuss with

## 2019-04-15 NOTE — PATIENT INSTRUCTIONS
Need to discuss depression with PCP  Continue current medications  If you start having any heart issues call office  Follow up in 3 months

## 2019-04-17 ENCOUNTER — HOSPITAL ENCOUNTER (OUTPATIENT)
Dept: CARDIAC REHAB | Age: 59
Setting detail: THERAPIES SERIES
Discharge: HOME | End: 2019-04-17
Payer: MEDICARE

## 2019-04-17 PROCEDURE — 93798 PHYS/QHP OP CAR RHAB W/ECG: CPT

## 2019-04-19 ENCOUNTER — APPOINTMENT (OUTPATIENT)
Dept: CARDIAC REHAB | Age: 59
End: 2019-04-19
Payer: MEDICARE

## 2019-04-22 ENCOUNTER — HOSPITAL ENCOUNTER (OUTPATIENT)
Dept: CARDIAC REHAB | Age: 59
Setting detail: THERAPIES SERIES
Discharge: HOME | End: 2019-04-22
Payer: MEDICARE

## 2019-04-22 PROCEDURE — 93798 PHYS/QHP OP CAR RHAB W/ECG: CPT

## 2019-04-24 ENCOUNTER — HOSPITAL ENCOUNTER (OUTPATIENT)
Dept: CARDIAC REHAB | Age: 59
Setting detail: THERAPIES SERIES
Discharge: HOME | End: 2019-04-24
Payer: MEDICARE

## 2019-04-24 PROCEDURE — 93798 PHYS/QHP OP CAR RHAB W/ECG: CPT

## 2019-04-26 ENCOUNTER — HOSPITAL ENCOUNTER (OUTPATIENT)
Dept: CARDIAC REHAB | Age: 59
Setting detail: THERAPIES SERIES
End: 2019-04-26
Payer: MEDICARE

## 2019-04-29 ENCOUNTER — HOSPITAL ENCOUNTER (OUTPATIENT)
Dept: CARDIAC REHAB | Age: 59
Setting detail: THERAPIES SERIES
Discharge: HOME | End: 2019-04-29
Payer: MEDICARE

## 2019-04-29 PROCEDURE — 93798 PHYS/QHP OP CAR RHAB W/ECG: CPT

## 2019-05-01 ENCOUNTER — HOSPITAL ENCOUNTER (OUTPATIENT)
Dept: CARDIAC REHAB | Age: 59
Setting detail: THERAPIES SERIES
Discharge: HOME | End: 2019-05-01
Payer: MEDICARE

## 2019-05-01 PROCEDURE — 93798 PHYS/QHP OP CAR RHAB W/ECG: CPT

## 2019-05-03 ENCOUNTER — HOSPITAL ENCOUNTER (OUTPATIENT)
Dept: CARDIAC REHAB | Age: 59
Setting detail: THERAPIES SERIES
End: 2019-05-03
Payer: MEDICARE

## 2019-05-06 ENCOUNTER — HOSPITAL ENCOUNTER (OUTPATIENT)
Dept: CARDIAC REHAB | Age: 59
Setting detail: THERAPIES SERIES
Discharge: HOME | End: 2019-05-06
Payer: MEDICARE

## 2019-05-06 PROCEDURE — 93798 PHYS/QHP OP CAR RHAB W/ECG: CPT

## 2019-05-08 ENCOUNTER — HOSPITAL ENCOUNTER (OUTPATIENT)
Dept: CARDIAC REHAB | Age: 59
Setting detail: THERAPIES SERIES
Discharge: HOME | End: 2019-05-08
Payer: MEDICARE

## 2019-05-08 PROCEDURE — 93798 PHYS/QHP OP CAR RHAB W/ECG: CPT

## 2019-05-10 ENCOUNTER — HOSPITAL ENCOUNTER (OUTPATIENT)
Dept: CARDIAC REHAB | Age: 59
Setting detail: THERAPIES SERIES
End: 2019-05-10
Payer: MEDICARE

## 2019-05-13 ENCOUNTER — HOSPITAL ENCOUNTER (OUTPATIENT)
Dept: CARDIAC REHAB | Age: 59
Setting detail: THERAPIES SERIES
End: 2019-05-13
Payer: MEDICARE

## 2019-05-15 ENCOUNTER — HOSPITAL ENCOUNTER (OUTPATIENT)
Dept: CARDIAC REHAB | Age: 59
Setting detail: THERAPIES SERIES
End: 2019-05-15
Payer: MEDICARE

## 2019-05-17 ENCOUNTER — APPOINTMENT (OUTPATIENT)
Dept: CARDIAC REHAB | Age: 59
End: 2019-05-17
Payer: MEDICARE

## 2019-05-20 ENCOUNTER — APPOINTMENT (OUTPATIENT)
Dept: CARDIAC REHAB | Age: 59
End: 2019-05-20
Payer: MEDICARE

## 2019-05-22 ENCOUNTER — APPOINTMENT (OUTPATIENT)
Dept: CARDIAC REHAB | Age: 59
End: 2019-05-22
Payer: MEDICARE

## 2019-05-24 ENCOUNTER — APPOINTMENT (OUTPATIENT)
Dept: CARDIAC REHAB | Age: 59
End: 2019-05-24
Payer: MEDICARE

## 2019-06-18 ENCOUNTER — TELEPHONE (OUTPATIENT)
Dept: CARDIOLOGY CLINIC | Age: 59
End: 2019-06-18

## 2019-08-05 ENCOUNTER — OFFICE VISIT (OUTPATIENT)
Dept: CARDIOLOGY CLINIC | Age: 59
End: 2019-08-05
Payer: MEDICARE

## 2019-08-05 VITALS
OXYGEN SATURATION: 98 % | SYSTOLIC BLOOD PRESSURE: 122 MMHG | HEIGHT: 68 IN | HEART RATE: 84 BPM | WEIGHT: 156 LBS | BODY MASS INDEX: 23.64 KG/M2 | DIASTOLIC BLOOD PRESSURE: 72 MMHG

## 2019-08-05 DIAGNOSIS — F17.200 SMOKING: ICD-10-CM

## 2019-08-05 DIAGNOSIS — I25.10 CORONARY ARTERY DISEASE DUE TO LIPID RICH PLAQUE: Primary | ICD-10-CM

## 2019-08-05 DIAGNOSIS — E78.2 MIXED HYPERLIPIDEMIA: ICD-10-CM

## 2019-08-05 DIAGNOSIS — I10 ESSENTIAL HYPERTENSION: ICD-10-CM

## 2019-08-05 DIAGNOSIS — I25.83 CORONARY ARTERY DISEASE DUE TO LIPID RICH PLAQUE: Primary | ICD-10-CM

## 2019-08-05 PROCEDURE — 99214 OFFICE O/P EST MOD 30 MIN: CPT | Performed by: INTERNAL MEDICINE

## 2019-08-05 RX ORDER — ATORVASTATIN CALCIUM 80 MG/1
20 TABLET, FILM COATED ORAL DAILY
COMMUNITY
End: 2019-08-05 | Stop reason: ALTCHOICE

## 2019-08-05 NOTE — PROGRESS NOTES
Worsens with lopressor    Plan:  Stable cardiac status  Same meds  F/u January to discuss Brilinta.        Thank you for allowing me to participate in the care of this individual.      Chen Ureña M.D., [facility]

## 2019-12-03 ENCOUNTER — TELEPHONE (OUTPATIENT)
Dept: CARDIOLOGY CLINIC | Age: 59
End: 2019-12-03

## 2020-01-03 RX ORDER — LOSARTAN POTASSIUM 25 MG/1
TABLET ORAL
Qty: 90 TABLET | Refills: 0 | Status: SHIPPED | OUTPATIENT
Start: 2020-01-03 | End: 2020-04-17

## 2020-01-03 NOTE — TELEPHONE ENCOUNTER
RX APPROVAL:      Refill:   Requested Prescriptions     Pending Prescriptions Disp Refills    losartan (COZAAR) 25 MG tablet [Pharmacy Med Name: LOSARTAN 25MG TABLETS] 90 tablet 3     Sig: TAKE 1 TABLET BY MOUTH EVERY DAY      Last OV: 8/5/2019   Last EKG:   Last Labs:   Lab Results   Component Value Date    GLUCOSE 100 03/26/2019    BUN 13 03/26/2019    CREATININE 1.0 03/26/2019    LABGLOM >60 03/26/2019     03/26/2019    K 5.0 03/26/2019    K 2.8 12/25/2018     03/26/2019    CO2 23 03/26/2019    CALCIUM 9.1 03/26/2019     Lab Results   Component Value Date     03/26/2019     03/26/2019    CO2 23 03/26/2019    ANIONGAP 14 03/26/2019    GLUCOSE 100 03/26/2019    BUN 13 03/26/2019    CREATININE 1.0 03/26/2019    LABGLOM >60 03/26/2019    GFRAA >60 03/26/2019    CALCIUM 9.1 03/26/2019    PROT 6.6 12/25/2018    LABALBU 4.1 12/25/2018    BILITOT <0.2 12/25/2018    ALKPHOS 115 12/25/2018    AST 44 03/26/2019    ALT 48 03/26/2019    GLOB 2.5 12/25/2018     Lab Results   Component Value Date    ALT 48 03/26/2019    AST 44 03/26/2019     Lab Results   Component Value Date    K 5.0 03/26/2019    K 2.8 12/25/2018       Plan and labs reviewed

## 2020-01-31 ENCOUNTER — OFFICE VISIT (OUTPATIENT)
Dept: CARDIOLOGY CLINIC | Age: 60
End: 2020-01-31
Payer: MEDICARE

## 2020-01-31 ENCOUNTER — TELEPHONE (OUTPATIENT)
Dept: CARDIOLOGY CLINIC | Age: 60
End: 2020-01-31

## 2020-01-31 VITALS
WEIGHT: 160 LBS | HEART RATE: 76 BPM | HEIGHT: 68 IN | OXYGEN SATURATION: 99 % | BODY MASS INDEX: 24.25 KG/M2 | SYSTOLIC BLOOD PRESSURE: 130 MMHG | DIASTOLIC BLOOD PRESSURE: 80 MMHG

## 2020-01-31 PROCEDURE — 99214 OFFICE O/P EST MOD 30 MIN: CPT | Performed by: NURSE PRACTITIONER

## 2020-01-31 RX ORDER — NEBIVOLOL 2.5 MG/1
2.5 TABLET ORAL DAILY
Qty: 30 TABLET | Refills: 1 | Status: SHIPPED | OUTPATIENT
Start: 2020-01-31 | End: 2020-07-31 | Stop reason: SINTOL

## 2020-01-31 NOTE — LETTER
[address] Cardiology [facility]  [address] [address]  Phone: [phone]  Fax: [phone]      January 31, 2020     Tahmina Brooks MD  [id number]  [address] [facility] [place] [address]    Patient: Afua Adams  MR Number: [id number]  YOB: 1960  Date of Visit: 1/31/2020    Dear Dr. Tahmina Brooks:    [facility]     Outpatient Follow Up Note    Afua Adams is 62 y.o. male who presents today with a history of VF arrest, IWMI / CAD s/p PTCA RCA Dec '18 and HTN . His other hx includes: CVA     CHIEF COMPLAINT / HPI:  Follow Up secondary to coronary artery disease    Subjective:   he denies significant chest pain. There is no SOB/TAVARES. However, when it was cold out and running in to the store, he got an ache. The patient denies orthopnea/PND. The patient does not have swelling. The patients weight is stable . The patient is not experiencing palpitations or dizziness. He has trouble sleeping. He did not tolerate taking crestor: pain in his upper back and brain fog; and LDL down to 23. He took metoprolol off/on for a week at a time but left him with severe depression. These symptoms show no change since the last OV. With regard to medication therapy the patient has been compliant with prescribed regimen. They have tolerated therapy to date. Current Outpatient Medications   Medication Sig Dispense Refill    losartan (COZAAR) 25 MG tablet TAKE 1 TABLET BY MOUTH EVERY DAY 90 tablet 0    ticagrelor (BRILINTA) 90 MG TABS tablet Take 1 tablet by mouth 2 times daily 60 tablet 11    aspirin 81 MG chewable tablet Take 1 tablet by mouth daily 90 tablet 3    FLUoxetine (PROZAC) 40 MG capsule Take 40 mg by mouth daily.          Objective:   PHYSICAL EXAM:        Vitals:    01/31/20 1354 01/31/20 1409   BP: 130/70 130/80   Site: Left Upper Arm Left Upper Arm   Position: Sitting    Cuff Size: Medium Adult Medium Adult   Pulse: 76 LDLCALC 23 03/26/2019    LDLCALC 133 (H) 09/25/2015    LDLCALC 148 (H) 01/08/2015     Lab Results   Component Value Date    LABVLDL 49 03/26/2019    LABVLDL 39 09/25/2015    LABVLDL 33 01/08/2015     Radiology Review:  Pertinent images / reports were reviewed as a part of this visit and reveals the following:    Last Echo: Dec '18:  Summary   -Normal left ventricular cavity size and left ventricular wall thickness.   -Overall left ventricular systolic function appears normal with a estimated   ejection fraction of 55-60%. Mild inferior hypokinesis. -Normal diastolic function. -Mild mitral regurgitation.   -Mild to moderate tricuspid regurgitation with a estimated RVSP of 36 mmHg. Last Angiogram: 12/28/18:  CORONARY ARTERIES     LM <10% stenosis. LAD There is streaming artifact noted.  Prox 30-40% stenosis. There is mid 75% stenosis just past D2. Williams Karma is a 50-60% stenosis after the first stenosis.  D1 is a small vessel with ostial 40-50% stenosis.  D2 is a large vessel with ostial 50% stenosis.  Distal LAD has 30-40% stenosis. LCX 10% prox-mid stenosis. RCA Dominant.  Prox 40% stenosis.  Mid 99% stenosis.  Distal 50-60% stenosis (improved appearance of distal stenosis noted in completion angio after PCI).  Successful PCI of RCA with single drug eluting stent       Assessment:      Diagnosis Orders   1. Coronary artery disease involving native coronary artery of native heart without angina pectoris   ~s/p PTCA RCA Dec '18  ~offers no c/o angina  ~intolerant to metoprolol with c/o worsening depression  ~hx VF arrest '18 : amiodarone stopped. BB continued  ~remains on DAPT  ~c/o brain fog on crestor Lipid, Fasting    Comprehensive Metabolic Panel   2. Essential hypertension   ~controlled    3. Mixed hyperlipidemia   ~trig up / HDL high  ~stopped taking statin with low LDL  ~hx of CVAs (seen by Dr. George Andrews)      I had the opportunity to review the clinical symptoms and presentation of Isha Tobar. Plan:     1. Trial of Bystolic 2.5 mg daily as intolerant to metoprolol (pt's request to try)  2. Lipid profile/CMP as routine  3. F/U in six months    Overall the patient is stable from CV standpoint      If you have questions, please do not hesitate to call me. I look forward to following Lori Villalta along with you.     Sincerely,      Patricia Andujar, APRN - CNP

## 2020-01-31 NOTE — COMMUNICATION BODY
[address]     Outpatient Follow Up Note    Rachel Keller is 62 y.o. male who presents today with a history of VF arrest, IWMI / CAD s/p PTCA RCA Dec '18 and HTN . His other hx includes: CVA     CHIEF COMPLAINT / HPI:  Follow Up secondary to coronary artery disease    Subjective:   he denies significant chest pain. There is no SOB/TAVARES. However, when it was cold out and running in to the store, he got an ache. The patient denies orthopnea/PND. The patient does not have swelling. The patients weight is stable . The patient is not experiencing palpitations or dizziness. He has trouble sleeping. He did not tolerate taking crestor: pain in his upper back and brain fog; and LDL down to 23. He took metoprolol off/on for a week at a time but left him with severe depression. These symptoms show no change since the last OV. With regard to medication therapy the patient has been compliant with prescribed regimen. They have tolerated therapy to date. Current Outpatient Medications   Medication Sig Dispense Refill    losartan (COZAAR) 25 MG tablet TAKE 1 TABLET BY MOUTH EVERY DAY 90 tablet 0    ticagrelor (BRILINTA) 90 MG TABS tablet Take 1 tablet by mouth 2 times daily 60 tablet 11    aspirin 81 MG chewable tablet Take 1 tablet by mouth daily 90 tablet 3    FLUoxetine (PROZAC) 40 MG capsule Take 40 mg by mouth daily.          Objective:   PHYSICAL EXAM:        Vitals:    01/31/20 1354 01/31/20 1409   BP: 130/70 130/80   Site: Left Upper Arm Left Upper Arm   Position: Sitting    Cuff Size: Medium Adult Medium Adult   Pulse: 76    SpO2: 99%    Weight: 160 lb (72.6 kg)    Height: 5' 8\" (1.727 m)        VITALS:  /70 (Site: Left Upper Arm, Position: Sitting, Cuff Size: Medium Adult)   Pulse 76   Ht 5' 8\" (1.727 m)   Wt 160 lb (72.6 kg)   SpO2 99%   BMI 24.33 kg/m²    CONSTITUTIONAL: Cooperative, no apparent distress, and appears well nourished / developed  NEUROLOGIC:  Awake and orientated to person, place and time. PSYCH: Calm affect. SKIN: Warm and dry. HEENT: Sclera non-icteric, normocephalic, neck supple, no elevation of JVP, normal carotid pulses with no bruits and thyroid normal size. LUNGS:  No increased work of breathing and clear to auscultation, no crackles or wheezing  CARDIOVASCULAR:  Regular rate 92 and rhythm with no murmurs, gallops, rubs, or abnormal heart sounds, normal PMI. The apical impulses not displaced  JVP less than 8 cm H2O  Heart tones are crisp and normal  Cervical veins are not engorged  The carotid upstroke is normal in amplitude and contour without delay or bruit  JVP is not elevated  ABDOMEN:  Normal bowel sounds, non-distended and non-tender to palpation  EXT: No edema, no calf tenderness. Pulses are present bilaterally.     DATA:    Lab Results   Component Value Date    ALT 48 (H) 03/26/2019    AST 44 (H) 03/26/2019    ALKPHOS 115 12/25/2018    BILITOT <0.2 12/25/2018     Lab Results   Component Value Date    CREATININE 1.0 03/26/2019    BUN 13 03/26/2019     03/26/2019    K 5.0 03/26/2019     03/26/2019    CO2 23 03/26/2019       Lab Results   Component Value Date    WBC 14.0 (H) 12/26/2018    HGB 12.6 (L) 12/26/2018    HCT 37.8 (L) 12/26/2018    MCV 93.9 12/26/2018     12/26/2018     No components found for: CHLPL  Lab Results   Component Value Date    TRIG 243 (H) 03/26/2019    TRIG 197 (H) 09/25/2015    TRIG 165 (H) 01/08/2015     Lab Results   Component Value Date    HDL 76 (H) 03/26/2019    HDL 51 09/25/2015    HDL 45 01/08/2015     Lab Results   Component Value Date    LDLCALC 23 03/26/2019    LDLCALC 133 (H) 09/25/2015    LDLCALC 148 (H) 01/08/2015     Lab Results   Component Value Date    LABVLDL 49 03/26/2019    LABVLDL 39 09/25/2015    LABVLDL 33 01/08/2015     Radiology Review:  Pertinent images / reports were reviewed as a part of this visit and reveals the following:    Last Echo: Dec '18:  Summary   -Normal left ventricular cavity size and left ventricular wall thickness.   -Overall left ventricular systolic function appears normal with a estimated   ejection fraction of 55-60%. Mild inferior hypokinesis. -Normal diastolic function. -Mild mitral regurgitation.   -Mild to moderate tricuspid regurgitation with a estimated RVSP of 36 mmHg. Last Angiogram: 12/28/18:  CORONARY ARTERIES     LM <10% stenosis. LAD There is streaming artifact noted.  Prox 30-40% stenosis. There is mid 75% stenosis just past D2. Leilani Denney is a 50-60% stenosis after the first stenosis.  D1 is a small vessel with ostial 40-50% stenosis.  D2 is a large vessel with ostial 50% stenosis.  Distal LAD has 30-40% stenosis. LCX 10% prox-mid stenosis. RCA Dominant.  Prox 40% stenosis.  Mid 99% stenosis.  Distal 50-60% stenosis (improved appearance of distal stenosis noted in completion angio after PCI).  Successful PCI of RCA with single drug eluting stent       Assessment:      Diagnosis Orders   1. Coronary artery disease involving native coronary artery of native heart without angina pectoris   ~s/p PTCA RCA Dec '18  ~offers no c/o angina  ~intolerant to metoprolol with c/o worsening depression  ~hx VF arrest '18 : amiodarone stopped. BB continued  ~remains on DAPT  ~c/o brain fog on crestor Lipid, Fasting    Comprehensive Metabolic Panel   2. Essential hypertension   ~controlled    3. Mixed hyperlipidemia   ~trig up / HDL high  ~stopped taking statin with low LDL  ~hx of CVAs (seen by Dr. Hailey Lazaro)      I had the opportunity to review the clinical symptoms and presentation of Epi Casillas. Plan:     1. Trial of Bystolic 2.5 mg daily as intolerant to metoprolol (pt's request to try)  2. Lipid profile/CMP as routine  3. F/U in six months    Overall the patient is stable from CV standpoint    Thank you for allowing to us to participate in the care of Epi Casillas.     ERIKA Buenrostro

## 2020-01-31 NOTE — PROGRESS NOTES
[address]     Outpatient Follow Up Note    Virginie Chao is 62 y.o. male who presents today with a history of VF arrest, IWMI / CAD s/p PTCA RCA Dec '18 and HTN . His other hx includes: CVA     CHIEF COMPLAINT / HPI:  Follow Up secondary to coronary artery disease    Subjective:   he denies significant chest pain. There is no SOB/TAVARES. However, when it was cold out and running in to the store, he got an ache. The patient denies orthopnea/PND. The patient does not have swelling. The patients weight is stable . The patient is not experiencing palpitations or dizziness. He has trouble sleeping. He did not tolerate taking crestor: pain in his upper back and brain fog; and LDL down to 23. He took metoprolol off/on for a week at a time but left him with severe depression. These symptoms show no change since the last OV. With regard to medication therapy the patient has been compliant with prescribed regimen. They have tolerated therapy to date. Past Medical History:   Diagnosis Date    Cerebral artery occlusion with cerebral infarction ([facility] [place] [address]     Croup in pediatric patient     Hyperlipidemia     Hypertension      Social History:    Social History     Tobacco Use   Smoking Status Former Smoker    Packs/day: 1.00    Types: Cigarettes    Last attempt to quit: 2018    Years since quittin.1   Smokeless Tobacco Never Used   Tobacco Comment    using electronic cigarette as of 12     Current Medications:  Current Outpatient Medications   Medication Sig Dispense Refill    losartan (COZAAR) 25 MG tablet TAKE 1 TABLET BY MOUTH EVERY DAY 90 tablet 0    ticagrelor (BRILINTA) 90 MG TABS tablet Take 1 tablet by mouth 2 times daily 60 tablet 11    aspirin 81 MG chewable tablet Take 1 tablet by mouth daily 90 tablet 3    FLUoxetine (PROZAC) 40 MG capsule Take 40 mg by mouth daily.       metoprolol tartrate (LOPRESSOR) 12.5 mg TABS Take 0.5 mg by mouth 2 times daily 1/2 tab BID       No current facility-administered medications for this visit. REVIEW OF SYSTEMS:    CONSTITUTIONAL: No major weight gain or loss, fatigue, weakness, night sweats or fever. HEENT: No new vision difficulties or ringing in the ears. RESPIRATORY: No new SOB, PND, orthopnea or cough. CARDIOVASCULAR: See HPI  GI: No nausea, vomiting, diarrhea, constipation, abdominal pain or changes in bowel habits. : No urinary frequency, urgency, incontinence hematuria or dysuria. SKIN: No cyanosis or skin lesions. MUSCULOSKELETAL: No new muscle or joint pain. NEUROLOGICAL: No syncope or TIA-like symptoms. PSYCHIATRIC: No anxiety, pain, insomnia or depression    Objective:   PHYSICAL EXAM:        Vitals:    01/31/20 1354 01/31/20 1409   BP: 130/70 130/80   Site: Left Upper Arm Left Upper Arm   Position: Sitting    Cuff Size: Medium Adult Medium Adult   Pulse: 76    SpO2: 99%    Weight: 160 lb (72.6 kg)    Height: 5' 8\" (1.727 m)        VITALS:  /70 (Site: Left Upper Arm, Position: Sitting, Cuff Size: Medium Adult)   Pulse 76   Ht 5' 8\" (1.727 m)   Wt 160 lb (72.6 kg)   SpO2 99%   BMI 24.33 kg/m²   CONSTITUTIONAL: Cooperative, no apparent distress, and appears well nourished / developed  NEUROLOGIC:  Awake and orientated to person, place and time. PSYCH: Calm affect. SKIN: Warm and dry. HEENT: Sclera non-icteric, normocephalic, neck supple, no elevation of JVP, normal carotid pulses with no bruits and thyroid normal size. LUNGS:  No increased work of breathing and clear to auscultation, no crackles or wheezing  CARDIOVASCULAR:  Regular rate 92 and rhythm with no murmurs, gallops, rubs, or abnormal heart sounds, normal PMI. The apical impulses not displaced  JVP less than 8 cm H2O  Heart tones are crisp and normal  Cervical veins are not engorged  The carotid upstroke is normal in amplitude and contour without delay or bruit  JVP is not elevated  ABDOMEN:  Normal bowel sounds, non-distended and non-tender to palpation  EXT: No edema, no calf tenderness. Pulses are present bilaterally. DATA:    Lab Results   Component Value Date    ALT 48 (H) 03/26/2019    AST 44 (H) 03/26/2019    ALKPHOS 115 12/25/2018    BILITOT <0.2 12/25/2018     Lab Results   Component Value Date    CREATININE 1.0 03/26/2019    BUN 13 03/26/2019     03/26/2019    K 5.0 03/26/2019     03/26/2019    CO2 23 03/26/2019     Lab Results   Component Value Date    TSH 1.31 09/25/2015     Lab Results   Component Value Date    WBC 14.0 (H) 12/26/2018    HGB 12.6 (L) 12/26/2018    HCT 37.8 (L) 12/26/2018    MCV 93.9 12/26/2018     12/26/2018     No components found for: CHLPL  Lab Results   Component Value Date    TRIG 243 (H) 03/26/2019    TRIG 197 (H) 09/25/2015    TRIG 165 (H) 01/08/2015     Lab Results   Component Value Date    HDL 76 (H) 03/26/2019    HDL 51 09/25/2015    HDL 45 01/08/2015     Lab Results   Component Value Date    LDLCALC 23 03/26/2019    LDLCALC 133 (H) 09/25/2015    LDLCALC 148 (H) 01/08/2015     Lab Results   Component Value Date    LABVLDL 49 03/26/2019    LABVLDL 39 09/25/2015    LABVLDL 33 01/08/2015     Radiology Review:  Pertinent images / reports were reviewed as a part of this visit and reveals the following:    Last Echo: Dec '18:  Summary   -Normal left ventricular cavity size and left ventricular wall thickness.   -Overall left ventricular systolic function appears normal with a estimated   ejection fraction of 55-60%. Mild inferior hypokinesis. -Normal diastolic function. -Mild mitral regurgitation.   -Mild to moderate tricuspid regurgitation with a estimated RVSP of 36 mmHg. Last Angiogram: 12/28/18:  CORONARY ARTERIES     LM <10% stenosis. LAD There is streaming artifact noted.  Prox 30-40% stenosis. There is mid 75% stenosis just past D2. Emperatriz Monday is a 50-60% stenosis after the first stenosis.  D1 is a small vessel with ostial 40-50% stenosis.  D2 is a large vessel with ostial 50% stenosis.

## 2020-01-31 NOTE — PATIENT INSTRUCTIONS
trila of bystolic low dose 2.5 mg once daily    Fasting cholesterol levels as routine    appt in six months Stable

## 2020-07-08 NOTE — TELEPHONE ENCOUNTER
States pt has had a medication gap with  Losartan, It was filled on  4/17/20 for 30 days and not again until  6/1720. Asking if pt Is suppose to be taking  rosuvastatin 10 mg.  Please call to advise

## 2020-07-10 NOTE — TELEPHONE ENCOUNTER
MIKHAIL PATRICK with [facility] returned Aurelia Melvin call from yesterday. MIKHAIL PATRICK is concerned about the lapse in Mr. Trinity Eng medication and wanted to extend an offer of assistance. She has been unable to reach the patient but there are rebate programs available to him for his medications if this has been the barrier preventing him from getting his medications. Per his Jan 2020 visit with Deangelo Orlando he was having side effects he associated to the rosuvastatin (brain fog), there is no record of him filling this medication since then. I called Mr. Trinity Eng and left a message asking him to return a call to the office. He will need to have CMP and Lipids drawn prior to his 7/31/20 visit and to continue his medications. I also gave him Sharon's name and number at [facility] ([phone]) to discuss prescription rebate options.

## 2020-07-15 RX ORDER — LOSARTAN POTASSIUM 25 MG/1
TABLET ORAL
Qty: 30 TABLET | Refills: 0 | Status: ON HOLD | OUTPATIENT
Start: 2020-07-15 | End: 2020-10-06 | Stop reason: HOSPADM

## 2020-07-31 ENCOUNTER — OFFICE VISIT (OUTPATIENT)
Dept: CARDIOLOGY CLINIC | Age: 60
End: 2020-07-31
Payer: MEDICARE

## 2020-07-31 VITALS
HEIGHT: 68 IN | BODY MASS INDEX: 24.22 KG/M2 | DIASTOLIC BLOOD PRESSURE: 80 MMHG | SYSTOLIC BLOOD PRESSURE: 164 MMHG | WEIGHT: 159.8 LBS | OXYGEN SATURATION: 98 % | HEART RATE: 82 BPM

## 2020-07-31 PROCEDURE — 99214 OFFICE O/P EST MOD 30 MIN: CPT | Performed by: NURSE PRACTITIONER

## 2020-07-31 NOTE — PATIENT INSTRUCTIONS
Dr Violet Simeon [phone]  [address] [address], [address] [place] [address] [phone]    appt in six months

## 2020-07-31 NOTE — PROGRESS NOTES
[address]     Outpatient Follow Up Note    Ga Montano is 56 y.o. male who presents today with a history of VF arrest, IWMI / CAD s/p PTCA RCA Dec '18 and HTN . His other hx includes: CVA     CHIEF COMPLAINT / HPI:  Follow Up secondary to CAD / VF  with trial of Bystolic. He could not tolerate taking it d/t depression. Subjective:   he denies significant chest pain. There is no SOB/TAVARES. The patient denies orthopnea/PND. The patient does not have swelling. The patients weight is stable . The patient is not experiencing palpitations. Two to four weeks ago, he started having dizziness (everything started happening after his wife  unexpectedly in May). His dizziness kept him from driving. He found out that his Hgb was down and dx with MOI. A colonoscopy was recommended. These symptoms are stable since the last OV. His home BP runs ~ 120-130/ . With regard to medication therapy the patient has been compliant with prescribed regimen. They have tolerated therapy to date. Past Medical History:   Diagnosis Date    Cerebral artery occlusion with cerebral infarction ([facility] [address]     Croup in pediatric patient     Hyperlipidemia     Hypertension      Social History:    Social History     Tobacco Use   Smoking Status Former Smoker    Packs/day: 1.00    Types: Cigarettes    Last attempt to quit: 2018    Years since quittin.6   Smokeless Tobacco Never Used   Tobacco Comment    using electronic cigarette as of 12     Current Medications:  Current Outpatient Medications   Medication Sig Dispense Refill    losartan (COZAAR) 25 MG tablet TAKE 1 TABLET BY MOUTH EVERY DAY 30 tablet 0    aspirin 81 MG chewable tablet Take 1 tablet by mouth daily 90 tablet 3    FLUoxetine (PROZAC) 40 MG capsule Take 40 mg by mouth daily. No current facility-administered medications for this visit.       REVIEW OF SYSTEMS:    CONSTITUTIONAL: No major weight gain or loss, fatigue, weakness, night sweats or fever. HEENT: No new vision difficulties or ringing in the ears. RESPIRATORY: No new SOB, PND, orthopnea or cough. CARDIOVASCULAR: See HPI  GI: No nausea, vomiting, diarrhea, constipation, abdominal pain or changes in bowel habits. : No urinary frequency, urgency, incontinence hematuria or dysuria. SKIN: No cyanosis or skin lesions. MUSCULOSKELETAL: No new muscle or joint pain. NEUROLOGICAL: No syncope or TIA-like symptoms. PSYCHIATRIC: + depression taking BB ; wife  suddenly the end of May '20    Objective:   PHYSICAL EXAM:        Vitals:    20 1437 20 1510   BP: (!) 150/78 (!) 164/80   Site: Right Upper Arm    Position: Sitting    Cuff Size: Large Adult    Pulse: 82    SpO2: 98%    Weight: 159 lb 12.8 oz (72.5 kg)    Height: 5' 8\" (1.727 m)        VITALS:  BP (!) 164/80   Pulse 82   Ht 5' 8\" (1.727 m)   Wt 159 lb 12.8 oz (72.5 kg)   SpO2 98%   BMI 24.30 kg/m²   CONSTITUTIONAL: Cooperative, no apparent distress, and appears well nourished / developed  NEUROLOGIC:  Awake and orientated to person, place and time. PSYCH: Calm affect. SKIN: Warm and dry. HEENT: Sclera non-icteric, normocephalic, neck supple, no elevation of JVP, normal carotid pulses with no bruits and thyroid normal size. LUNGS:  No increased work of breathing and clear to auscultation, no crackles or wheezing  CARDIOVASCULAR:  Regular rate 76 and rhythm with no murmurs, gallops, rubs, or abnormal heart sounds, normal PMI. The apical impulses not displaced  JVP less than 8 cm H2O  Heart tones are crisp and normal  Cervical veins are not engorged  The carotid upstroke is normal in amplitude and contour without delay or bruit  JVP is not elevated  ABDOMEN:  Normal bowel sounds, non-distended and non-tender to palpation  EXT: No edema, no calf tenderness. Pulses are present bilaterally.     DATA:    Lab Results   Component Value Date    ALT 48 (H) 2019    AST 44 (H) 2019    ALKPHOS 115 12/25/2018    BILITOT <0.2 12/25/2018     Lab Results   Component Value Date    CREATININE 1.0 03/26/2019    BUN 13 03/26/2019     03/26/2019    K 5.0 03/26/2019     03/26/2019    CO2 23 03/26/2019     Lab Results   Component Value Date    TSH 1.31 09/25/2015     Lab Results   Component Value Date    WBC 14.0 (H) 12/26/2018    HGB 12.6 (L) 12/26/2018    HCT 37.8 (L) 12/26/2018    MCV 93.9 12/26/2018     12/26/2018     No components found for: CHLPL  Lab Results   Component Value Date    TRIG 243 (H) 03/26/2019    TRIG 197 (H) 09/25/2015    TRIG 165 (H) 01/08/2015     Lab Results   Component Value Date    HDL 76 (H) 03/26/2019    HDL 51 09/25/2015    HDL 45 01/08/2015     Lab Results   Component Value Date    LDLCALC 23 03/26/2019    LDLCALC 133 (H) 09/25/2015    LDLCALC 148 (H) 01/08/2015     Lab Results   Component Value Date    LABVLDL 49 03/26/2019    LABVLDL 39 09/25/2015    LABVLDL 33 01/08/2015     Radiology Review:  Pertinent images / reports were reviewed as a part of this visit and reveals the following:    Last Echo: Dec '18:  Summary   -Normal left ventricular cavity size and left ventricular wall thickness.   -Overall left ventricular systolic function appears normal with a estimated   ejection fraction of 55-60%. Mild inferior hypokinesis. -Normal diastolic function. -Mild mitral regurgitation.   -Mild to moderate tricuspid regurgitation with a estimated RVSP of 36 mmHg. Last Angiogram: 12/28/18:  CORONARY ARTERIES     LM <10% stenosis. LAD There is streaming artifact noted.  Prox 30-40% stenosis. There is mid 75% stenosis just past D2. Pernell Sos is a 50-60% stenosis after the first stenosis.  D1 is a small vessel with ostial 40-50% stenosis.  D2 is a large vessel with ostial 50% stenosis.  Distal LAD has 30-40% stenosis. LCX 10% prox-mid stenosis.    RCA Dominant.  Prox 40% stenosis.  Mid 99% stenosis.  Distal 50-60% stenosis (improved appearance of distal stenosis noted in completion angio after PCI).  Successful PCI of RCA with single drug eluting stent       Assessment:      Diagnosis Orders   1. Coronary artery disease involving native coronary artery of native heart without angina pectoris   ~stable : offers no c/o angina  ~s/p PTCA RCA Dec '18  ~intolerant to BB with depression : metoprolol and bystolic   ~hx VF arrest '18 : amiodarone stopped  ~remains on DAPT        2. Essential hypertension   ~suboptimal to elevated in office today  ~states controlled when checked at home  ~will monitor    3. Mixed hyperlipidemia   ~trig up / HDL high  ~stopped taking statin with low LDL and c/o brain fog  ~hx of CVAs (seen by Dr. Waldemar Pino)      I had the opportunity to review the clinical symptoms and presentation of Mercy Diaz. Plan:     1. Continue present management   2. F/U in six months with      Overall the patient is stable from CV standpoint    I have addresed the patient's cardiac risk factors and adjusted pharmacologic treatment as needed. In addition, I have reinforced the need for patient directed risk factor modification. Further evaluation will be based upon the patient's clinical course and testing results. All questions and concerns were addressed to the patient Alternatives to my treatment were discussed. The patient is not currently smoking. The risks related to smoking were reviewed with the patient. Recommend maintaining a smoke-free lifestyle. Patient is on a beta-blocker >> stopped taking  Patient is on an ace-i  Patient is on a statin >> stopped : c/o pain in his upper back and brain fog. Dual Antiplatelet therapy has been recommended / prescribed for this patient. Education conducted on adverse reactions including bleeding was discussed. The patient verbalizes understanding not to stop medications without discussing with us. Discussed exercise: 30-60 minutes 7 days/week  Discussed Low saturated fat diet.  BS up to 270 after drinking a beer    Thank you for allowing to us to participate in the care of [email or web address].     ERIKA Olmstead    Documentation of today's visit sent to PCP

## 2020-08-17 ENCOUNTER — TELEPHONE (OUTPATIENT)
Dept: CARDIOLOGY CLINIC | Age: 60
End: 2020-08-17

## 2020-08-17 NOTE — TELEPHONE ENCOUNTER
[facility] is faxing over paper work with statin recommendations for pt. Please fill out and send back. Any questions please call.

## 2020-10-05 ENCOUNTER — HOSPITAL ENCOUNTER (INPATIENT)
Age: 60
LOS: 1 days | Discharge: HOME | DRG: 305 | End: 2020-10-06
Attending: EMERGENCY MEDICINE | Admitting: INTERNAL MEDICINE
Payer: MEDICARE

## 2020-10-05 ENCOUNTER — APPOINTMENT (OUTPATIENT)
Dept: GENERAL RADIOLOGY | Age: 60
DRG: 305 | End: 2020-10-05
Payer: MEDICARE

## 2020-10-05 PROBLEM — R55 SYNCOPE, NEAR: Status: ACTIVE | Noted: 2020-10-05

## 2020-10-05 PROBLEM — I16.1 HYPERTENSIVE EMERGENCY: Status: ACTIVE | Noted: 2020-10-05

## 2020-10-05 PROBLEM — E87.1 HYPONATREMIA: Status: ACTIVE | Noted: 2020-10-05

## 2020-10-05 LAB
ALBUMIN SERPL-MCNC: 4.6 G/DL (ref 3.4–5)
ALP BLD-CCNC: 83 U/L (ref 40–129)
ALT SERPL-CCNC: 16 U/L (ref 10–40)
ANION GAP SERPL CALCULATED.3IONS-SCNC: 12 MMOL/L (ref 3–16)
ANION GAP SERPL CALCULATED.3IONS-SCNC: 7 MMOL/L (ref 3–16)
AST SERPL-CCNC: 24 U/L (ref 15–37)
BASOPHILS ABSOLUTE: 0 K/UL (ref 0–0.2)
BASOPHILS RELATIVE PERCENT: 0.5 %
BILIRUB SERPL-MCNC: 0.3 MG/DL (ref 0–1)
BILIRUBIN DIRECT: <0.2 MG/DL (ref 0–0.3)
BILIRUBIN, INDIRECT: NORMAL MG/DL (ref 0–1)
BUN BLDV-MCNC: 5 MG/DL (ref 7–20)
BUN BLDV-MCNC: 6 MG/DL (ref 7–20)
CALCIUM SERPL-MCNC: 8.8 MG/DL (ref 8.3–10.6)
CALCIUM SERPL-MCNC: 9.5 MG/DL (ref 8.3–10.6)
CHLORIDE BLD-SCNC: 100 MMOL/L (ref 99–110)
CHLORIDE BLD-SCNC: 94 MMOL/L (ref 99–110)
CO2: 25 MMOL/L (ref 21–32)
CO2: 27 MMOL/L (ref 21–32)
CREAT SERPL-MCNC: 0.7 MG/DL (ref 0.8–1.3)
CREAT SERPL-MCNC: 0.7 MG/DL (ref 0.8–1.3)
EKG ATRIAL RATE: 65 BPM
EKG DIAGNOSIS: NORMAL
EKG P AXIS: 54 DEGREES
EKG P-R INTERVAL: 136 MS
EKG Q-T INTERVAL: 398 MS
EKG QRS DURATION: 82 MS
EKG QTC CALCULATION (BAZETT): 413 MS
EKG R AXIS: -5 DEGREES
EKG T AXIS: 15 DEGREES
EKG VENTRICULAR RATE: 65 BPM
EOSINOPHILS ABSOLUTE: 0.2 K/UL (ref 0–0.6)
EOSINOPHILS RELATIVE PERCENT: 2.8 %
GFR AFRICAN AMERICAN: >60
GFR AFRICAN AMERICAN: >60
GFR NON-AFRICAN AMERICAN: >60
GFR NON-AFRICAN AMERICAN: >60
GLUCOSE BLD-MCNC: 101 MG/DL (ref 70–99)
GLUCOSE BLD-MCNC: 152 MG/DL (ref 70–99)
HCT VFR BLD CALC: 41.1 % (ref 40.5–52.5)
HEMOGLOBIN: 14.2 G/DL (ref 13.5–17.5)
LIPASE: 39 U/L (ref 13–60)
LV EF: 58 %
LVEF MODALITY: NORMAL
LYMPHOCYTES ABSOLUTE: 1.2 K/UL (ref 1–5.1)
LYMPHOCYTES RELATIVE PERCENT: 21.5 %
MCH RBC QN AUTO: 33.3 PG (ref 26–34)
MCHC RBC AUTO-ENTMCNC: 34.6 G/DL (ref 31–36)
MCV RBC AUTO: 96.2 FL (ref 80–100)
MONOCYTES ABSOLUTE: 0.4 K/UL (ref 0–1.3)
MONOCYTES RELATIVE PERCENT: 7.2 %
NEUTROPHILS ABSOLUTE: 3.7 K/UL (ref 1.7–7.7)
NEUTROPHILS RELATIVE PERCENT: 68 %
PDW BLD-RTO: 14.9 % (ref 12.4–15.4)
PLATELET # BLD: 212 K/UL (ref 135–450)
PMV BLD AUTO: 8.1 FL (ref 5–10.5)
POTASSIUM REFLEX MAGNESIUM: 3.9 MMOL/L (ref 3.5–5.1)
POTASSIUM SERPL-SCNC: 4.2 MMOL/L (ref 3.5–5.1)
RBC # BLD: 4.28 M/UL (ref 4.2–5.9)
SODIUM BLD-SCNC: 131 MMOL/L (ref 136–145)
SODIUM BLD-SCNC: 134 MMOL/L (ref 136–145)
TOTAL PROTEIN: 7.1 G/DL (ref 6.4–8.2)
TROPONIN: <0.01 NG/ML
WBC # BLD: 5.4 K/UL (ref 4–11)

## 2020-10-05 PROCEDURE — 2500000003 HC RX 250 WO HCPCS: Performed by: EMERGENCY MEDICINE

## 2020-10-05 PROCEDURE — 96361 HYDRATE IV INFUSION ADD-ON: CPT

## 2020-10-05 PROCEDURE — 97116 GAIT TRAINING THERAPY: CPT

## 2020-10-05 PROCEDURE — 80048 BASIC METABOLIC PNL TOTAL CA: CPT

## 2020-10-05 PROCEDURE — 97530 THERAPEUTIC ACTIVITIES: CPT

## 2020-10-05 PROCEDURE — 96372 THER/PROPH/DIAG INJ SC/IM: CPT

## 2020-10-05 PROCEDURE — 1200000000 HC SEMI PRIVATE

## 2020-10-05 PROCEDURE — 93306 TTE W/DOPPLER COMPLETE: CPT

## 2020-10-05 PROCEDURE — 99285 EMERGENCY DEPT VISIT HI MDM: CPT

## 2020-10-05 PROCEDURE — 2580000003 HC RX 258: Performed by: INTERNAL MEDICINE

## 2020-10-05 PROCEDURE — 83690 ASSAY OF LIPASE: CPT

## 2020-10-05 PROCEDURE — 96375 TX/PRO/DX INJ NEW DRUG ADDON: CPT

## 2020-10-05 PROCEDURE — 6370000000 HC RX 637 (ALT 250 FOR IP): Performed by: INTERNAL MEDICINE

## 2020-10-05 PROCEDURE — 71046 X-RAY EXAM CHEST 2 VIEWS: CPT

## 2020-10-05 PROCEDURE — G0378 HOSPITAL OBSERVATION PER HR: HCPCS

## 2020-10-05 PROCEDURE — 85025 COMPLETE CBC W/AUTO DIFF WBC: CPT

## 2020-10-05 PROCEDURE — 93010 ELECTROCARDIOGRAM REPORT: CPT | Performed by: INTERNAL MEDICINE

## 2020-10-05 PROCEDURE — 2580000003 HC RX 258: Performed by: EMERGENCY MEDICINE

## 2020-10-05 PROCEDURE — 97161 PT EVAL LOW COMPLEX 20 MIN: CPT

## 2020-10-05 PROCEDURE — 96374 THER/PROPH/DIAG INJ IV PUSH: CPT

## 2020-10-05 PROCEDURE — 93005 ELECTROCARDIOGRAM TRACING: CPT | Performed by: EMERGENCY MEDICINE

## 2020-10-05 PROCEDURE — 80076 HEPATIC FUNCTION PANEL: CPT

## 2020-10-05 PROCEDURE — 97165 OT EVAL LOW COMPLEX 30 MIN: CPT

## 2020-10-05 PROCEDURE — 84484 ASSAY OF TROPONIN QUANT: CPT

## 2020-10-05 PROCEDURE — 6370000000 HC RX 637 (ALT 250 FOR IP): Performed by: EMERGENCY MEDICINE

## 2020-10-05 PROCEDURE — 6360000002 HC RX W HCPCS: Performed by: INTERNAL MEDICINE

## 2020-10-05 RX ORDER — TRIAMTERENE AND HYDROCHLOROTHIAZIDE 37.5; 25 MG/1; MG/1
0.5 TABLET ORAL DAILY
Status: ON HOLD | COMMUNITY
End: 2020-10-06 | Stop reason: HOSPADM

## 2020-10-05 RX ORDER — POTASSIUM CHLORIDE 20 MEQ/1
40 TABLET, EXTENDED RELEASE ORAL PRN
Status: DISCONTINUED | OUTPATIENT
Start: 2020-10-05 | End: 2020-10-05 | Stop reason: SDUPTHER

## 2020-10-05 RX ORDER — LABETALOL HYDROCHLORIDE 5 MG/ML
10 INJECTION, SOLUTION INTRAVENOUS EVERY 6 HOURS PRN
Status: DISCONTINUED | OUTPATIENT
Start: 2020-10-05 | End: 2020-10-06 | Stop reason: HOSPADM

## 2020-10-05 RX ORDER — FLUOXETINE HYDROCHLORIDE 20 MG/1
40 CAPSULE ORAL DAILY
Status: DISCONTINUED | OUTPATIENT
Start: 2020-10-05 | End: 2020-10-06 | Stop reason: HOSPADM

## 2020-10-05 RX ORDER — MAGNESIUM OXIDE 400 MG/1
400 TABLET ORAL DAILY
Status: ON HOLD | COMMUNITY
End: 2020-10-05

## 2020-10-05 RX ORDER — PROMETHAZINE HYDROCHLORIDE 25 MG/1
12.5 TABLET ORAL EVERY 6 HOURS PRN
Status: DISCONTINUED | OUTPATIENT
Start: 2020-10-05 | End: 2020-10-06 | Stop reason: HOSPADM

## 2020-10-05 RX ORDER — ONDANSETRON 2 MG/ML
4 INJECTION INTRAMUSCULAR; INTRAVENOUS EVERY 6 HOURS PRN
Status: DISCONTINUED | OUTPATIENT
Start: 2020-10-05 | End: 2020-10-06 | Stop reason: HOSPADM

## 2020-10-05 RX ORDER — ASPIRIN 81 MG/1
81 TABLET, CHEWABLE ORAL DAILY
Status: DISCONTINUED | OUTPATIENT
Start: 2020-10-05 | End: 2020-10-06 | Stop reason: HOSPADM

## 2020-10-05 RX ORDER — OMEGA-3S/DHA/EPA/FISH OIL/D3 300MG-1000
400 CAPSULE ORAL DAILY
Status: ON HOLD | COMMUNITY
End: 2020-10-05

## 2020-10-05 RX ORDER — ACETAMINOPHEN 650 MG/1
650 SUPPOSITORY RECTAL EVERY 6 HOURS PRN
Status: DISCONTINUED | OUTPATIENT
Start: 2020-10-05 | End: 2020-10-06 | Stop reason: HOSPADM

## 2020-10-05 RX ORDER — 0.9 % SODIUM CHLORIDE 0.9 %
1000 INTRAVENOUS SOLUTION INTRAVENOUS ONCE
Status: COMPLETED | OUTPATIENT
Start: 2020-10-05 | End: 2020-10-05

## 2020-10-05 RX ORDER — ASCORBIC ACID 500 MG
1000 TABLET ORAL DAILY
Status: ON HOLD | COMMUNITY
End: 2020-10-05

## 2020-10-05 RX ORDER — HYDRALAZINE HYDROCHLORIDE 20 MG/ML
10 INJECTION INTRAMUSCULAR; INTRAVENOUS EVERY 6 HOURS PRN
Status: DISCONTINUED | OUTPATIENT
Start: 2020-10-05 | End: 2020-10-06 | Stop reason: HOSPADM

## 2020-10-05 RX ORDER — SODIUM CHLORIDE 0.9 % (FLUSH) 0.9 %
10 SYRINGE (ML) INJECTION PRN
Status: DISCONTINUED | OUTPATIENT
Start: 2020-10-05 | End: 2020-10-06 | Stop reason: HOSPADM

## 2020-10-05 RX ORDER — ACETAMINOPHEN 325 MG/1
650 TABLET ORAL EVERY 6 HOURS PRN
Status: DISCONTINUED | OUTPATIENT
Start: 2020-10-05 | End: 2020-10-06 | Stop reason: HOSPADM

## 2020-10-05 RX ORDER — POTASSIUM CHLORIDE 20 MEQ/1
40 TABLET, EXTENDED RELEASE ORAL PRN
Status: DISCONTINUED | OUTPATIENT
Start: 2020-10-05 | End: 2020-10-06 | Stop reason: HOSPADM

## 2020-10-05 RX ORDER — LOSARTAN POTASSIUM 25 MG/1
50 TABLET ORAL DAILY
Status: DISCONTINUED | OUTPATIENT
Start: 2020-10-05 | End: 2020-10-06 | Stop reason: HOSPADM

## 2020-10-05 RX ORDER — SODIUM CHLORIDE 9 MG/ML
INJECTION, SOLUTION INTRAVENOUS CONTINUOUS
Status: DISCONTINUED | OUTPATIENT
Start: 2020-10-05 | End: 2020-10-05

## 2020-10-05 RX ORDER — POTASSIUM CHLORIDE 7.45 MG/ML
10 INJECTION INTRAVENOUS PRN
Status: DISCONTINUED | OUTPATIENT
Start: 2020-10-05 | End: 2020-10-05 | Stop reason: SDUPTHER

## 2020-10-05 RX ORDER — 0.9 % SODIUM CHLORIDE 0.9 %
500 INTRAVENOUS SOLUTION INTRAVENOUS PRN
Status: DISCONTINUED | OUTPATIENT
Start: 2020-10-05 | End: 2020-10-06 | Stop reason: HOSPADM

## 2020-10-05 RX ORDER — SODIUM CHLORIDE 0.9 % (FLUSH) 0.9 %
10 SYRINGE (ML) INJECTION EVERY 12 HOURS SCHEDULED
Status: DISCONTINUED | OUTPATIENT
Start: 2020-10-05 | End: 2020-10-06 | Stop reason: HOSPADM

## 2020-10-05 RX ORDER — POTASSIUM CHLORIDE 7.45 MG/ML
10 INJECTION INTRAVENOUS PRN
Status: DISCONTINUED | OUTPATIENT
Start: 2020-10-05 | End: 2020-10-06 | Stop reason: HOSPADM

## 2020-10-05 RX ADMIN — Medication 10 ML: at 09:00

## 2020-10-05 RX ADMIN — Medication 10 ML: at 20:34

## 2020-10-05 RX ADMIN — ENOXAPARIN SODIUM 40 MG: 40 INJECTION SUBCUTANEOUS at 08:09

## 2020-10-05 RX ADMIN — ASPIRIN 81 MG: 81 TABLET, CHEWABLE ORAL at 08:09

## 2020-10-05 RX ADMIN — SODIUM CHLORIDE 1000 ML: 9 INJECTION, SOLUTION INTRAVENOUS at 03:40

## 2020-10-05 RX ADMIN — FAMOTIDINE 20 MG: 10 INJECTION INTRAVENOUS at 03:40

## 2020-10-05 RX ADMIN — SODIUM CHLORIDE: 9 INJECTION, SOLUTION INTRAVENOUS at 05:00

## 2020-10-05 RX ADMIN — LIDOCAINE HYDROCHLORIDE: 20 SOLUTION ORAL; TOPICAL at 02:40

## 2020-10-05 RX ADMIN — LOSARTAN POTASSIUM 50 MG: 25 TABLET, FILM COATED ORAL at 08:09

## 2020-10-05 RX ADMIN — HYDRALAZINE HYDROCHLORIDE 10 MG: 20 INJECTION INTRAMUSCULAR; INTRAVENOUS at 16:29

## 2020-10-05 RX ADMIN — ACETAMINOPHEN 650 MG: 325 TABLET ORAL at 20:33

## 2020-10-05 RX ADMIN — FLUOXETINE 40 MG: 20 CAPSULE ORAL at 08:09

## 2020-10-05 ASSESSMENT — PAIN SCALES - GENERAL
PAINLEVEL_OUTOF10: 0
PAINLEVEL_OUTOF10: 0
PAINLEVEL_OUTOF10: 6
PAINLEVEL_OUTOF10: 0

## 2020-10-05 ASSESSMENT — ENCOUNTER SYMPTOMS
EYE PAIN: 0
CHEST TIGHTNESS: 1
FACIAL SWELLING: 0
EYE REDNESS: 0
ABDOMINAL PAIN: 1
SHORTNESS OF BREATH: 0

## 2020-10-05 ASSESSMENT — PAIN DESCRIPTION - PAIN TYPE: TYPE: CHRONIC PAIN

## 2020-10-05 ASSESSMENT — PAIN DESCRIPTION - LOCATION: LOCATION: NECK

## 2020-10-05 NOTE — PROGRESS NOTES
While looking through patient belongings, I found a switchblade in the patient's back pocket. Notified Security to come and get the knife. Informed patient he could have it back at discharge.

## 2020-10-05 NOTE — PROGRESS NOTES
Up to bathroom several times today with minimal assistance. States, \" I feel a lot better since my BP is lower\". Talks a lot about taking multiple oral herbal supplements on an empty stomach.  Informed of need to eat food during the day to coat the stomach before taking medications that can cause stomach upset for example: iron supplements

## 2020-10-05 NOTE — PROGRESS NOTES
4 Eyes Skin Assessment     NAME:  Verito Rodriguez  YOB: 1960  MEDICAL RECORD NUMBER:  [id number]    The patient is being assess for  Admission    I agree that 2 RN's have performed a thorough Head to Toe Skin Assessment on the patient. ALL assessment sites listed below have been assessed. Areas assessed by both nurses:    Head, Face, Ears, Shoulders, Back, Chest, Arms, Elbows, Hands, Sacrum. Buttock, Coccyx, Ischium and Legs. Feet and Heels        Does the Patient have a Wound?  No noted wound(s)       King Prevention initiated:  Yes   Wound Care Orders initiated:  No    Pressure Injury (Stage 3,4, Unstageable, DTI, NWPT, and Complex wounds) if present place consult order under [de-identified] NA    New and Established Ostomies if present place consult order under : NA      Nurse 1 eSignature: Electronically signed by Martin Contreras RN on 10/5/20 at 6:10 AM EDT    **SHARE this note so that the co-signing nurse is able to place an eSignature**    Nurse 2 eSignature: Electronically signed by Yemi Marti RN on 10/5/20 at 6:14 AM EDT

## 2020-10-05 NOTE — PROGRESS NOTES
Patient clarifies his statement about having dysphagia. States that he told PT/OT that the GI cocktail made it difficult for him to swallow. Also states that he usually only eats one meal per day, eats meats and cheese.

## 2020-10-05 NOTE — PLAN OF CARE
Problem: Cardiac Output - Decreased:  Goal: Hemodynamic stability will improve  Description: Pulse rate and rhythm, peripheral pulses, and capillary refill assessed every shift with assessment. General color and body temperature monitored throughout shift and with vitals. Assess for edema with head to toe assessment. Administer treatments and medications as ordered. Monitor patient's weight. Hemodynamic stability will improve  10/5/2020 1326 by Efren Germain RN  Note: Pulse rate and rhythm, peripheral pulses, and capillary refill assessed every shift with assessment. General color and body temperature monitored throughout shift and with vitals. Assess for edema with head to toe assessment. Administer treatments and medications as ordered. Monitor patient's weight.    10/5/2020 0748 by Ailyn Hunter, [place]  Outcome: Ongoing

## 2020-10-05 NOTE — PROGRESS NOTES
Occupational Therapy   Occupational Therapy Initial Assessment/Discharge Summary  Date: 10/5/2020   Patient Name: Kenyon Redding  MRN: [id number]     : 1960    Date of Service: 10/5/2020    Discharge Recommendations: Kenyon Redding scored a 24/24 on the AM-PAC ADL Inpatient form. At this time, no further OT is recommended upon discharge due to pt being at baseline function and his secondary complication of fluctuating BP is not affecting his ability to complete ADL/ IADL tasks. Recommend patient returns to prior setting with prior services. Assessment   Decision Making: Low Complexity  OT Education: OT Role;Plan of Care  Patient Education: pt is an independent learner  Barriers to Learning: none  Activity Tolerance  Activity Tolerance: Patient Tolerated treatment well  Activity Tolerance: Pt tolerated treatment well; pt reported feeling dizzy, however; was able to ambulate around unit and reported this feeling as normal. Orthostatic BP was taken in supine 156/66, /80, standing in place 182/84, standing after ambulating 172/75. Pt did not require rest breaks throughout ambulation and mentioned that he felt safe going home. No skilled OT services are recommended at this time due to pt being at baseline function with a change in BP fluctuations. Safety Devices  Safety Devices in place: Yes  Type of devices: All fall risk precautions in place; Bed alarm in place;Call light within reach; Left in bed;Patient at risk for falls;Nurse notified  Restraints  Initially in place: No           Patient Diagnosis(es): The primary encounter diagnosis was Near syncope. Diagnoses of Hyponatremia, History of coronary artery disease, Acute epigastric pain, and Essential hypertension were also pertinent to this visit. has a past medical history of Cerebral artery occlusion with cerebral infarction [facility]), Croup in pediatric patient, Hyperlipidemia, and Hypertension.    has a past surgical history that includes hernia repair and Cardiac surgery. Restrictions  Restrictions/Precautions  Restrictions/Precautions: Fall Risk  Position Activity Restriction  Other position/activity restrictions: 56 y.o. male with pertinent past medical history of hypertension, coronary artery disease, prior CVA with residual speech deficit who was brought in by EMS transportation for elevated blood pressure. Patient states he took her blood pressure at home and each arm and noted his systolic was over 441. He has been taking his medications. Patient also reports 5 to 6 days of dizziness, states he feels he is about to pass out, nothing seems to bring on these episodes or make them worse. Associated with some mild right-sided neck pain. Patient also reports epigastric burning abdominal pain which he attributes to \"indigestion. \"   Initial troponins are negative from ER. Pt to be admitted for futher workup and improved BP control. Pt also noted to have some mild hyponatremia on admt    Subjective   General  Chart Reviewed: Yes  Additional Pertinent Hx: hypertension, CAD, previous CVA w/ residual speech deficits  Family / Caregiver Present: Yes(Dtr)  Diagnosis: Hypertensive emergency, near syncope  Subjective  Subjective: Pt supine in bed upon arrival, agreeable to therapy. Dtr at bedside. Communicated with RN that patient complains of difficulty swallowing and feeling like food is 'caught' in his throat.     Patient Currently in Pain: Denies  Vital Signs  BP: (!) 157/95  BP Location: Right upper arm  BP Upper/Lower: Upper  Patient Currently in Pain: Denies  Social/Functional History  Social/Functional History  Lives With: Alone  Type of Home: Apartment(Condo)  Home Layout: One level  Home Access: Stairs to enter with rails  Entrance Stairs - Number of Steps: 3 TAMERA  Entrance Stairs - Rails: Both  Bathroom Shower/Tub: Walk-in shower  Bathroom Toilet: Standard  Bathroom Equipment: Grab bars in shower, Hand-held shower, Built-in shower seat  Home Equipment: Rolling walker  ADL Assistance: Independent  Homemaking Assistance: Independent  Homemaking Responsibilities: Yes  Ambulation Assistance: Independent  Transfer Assistance: Independent  Active : Yes  Type of occupation: [employer] [place]: boating, workout  Additional Comments: No falls in past 6 months       Objective   Vision: Impaired  Vision Exceptions: Wears glasses for reading  Hearing: Within functional limits    Orientation  Overall Orientation Status: Within Functional Limits  Observation/Palpation  Posture: Good  Balance  Sitting Balance: Independent  Standing Balance: Independent  Functional Mobility  Functional - Mobility Device: No device  Activity: Other  Assist Level: Independent  Functional Mobility Comments: pt able to ambulate in hallway with some dizziness  ADL  Additional Comments: Pt declined ADL needs at this time, however; pt did demonstrate figure 4 position while sitting EOB indicating his ability to be independent with donning/doffing sock. Tone RUE  RUE Tone: Normotonic  Tone LUE  LUE Tone: Normotonic  Coordination  Movements Are Fluid And Coordinated: Yes     Bed mobility  Rolling to Right: Independent  Supine to Sit: Independent  Scooting: Independent  Transfers  Sit to stand: Independent  Stand to sit:  Independent     Cognition  Overall Cognitive Status: WFL  Perception  Overall Perceptual Status: WFL   Limited language due to prior CVA              LUE AROM (degrees)  LUE AROM : WNL  RUE AROM (degrees)  RUE AROM : WNL        Plan   Plan  Times per week: discharge to home      AM-PAC Score     AM-PAC Inpatient Daily Activity Raw Score: 24 (10/05/20 1047)  AM-PAC Inpatient ADL T-Scale Score : 57.54 (10/05/20 1047)  ADL Inpatient CMS 0-100% Score: 0 (10/05/20 1047)  ADL Inpatient CMS G-Code Modifier : 509 [address] (10/05/20 1047)    Goals  Patient Goals   Patient goals : no goals needed at this time       Therapy Time   Individual Concurrent Group Co-treatment   Time In 0838         Time Out 0921         Minutes 43               Timed Code Treatment Minutes: 28 minutes    Total Treatment Minutes:  43 minutes    YOCASTA Ludwig  Therapist directly observed and directed this treatment.   OKSANA Perez/LAVINIA [id number]    Karlee Srivastava OT

## 2020-10-05 NOTE — PROGRESS NOTES
Physical Therapy    Facility/Department: [facility]  Initial Assessment/Discharge    NAME: Malena Doshi  : 1960  MRN: [id number]    Date of Service: 10/5/2020    Discharge Recommendations:Hima Carpenter scored a / on the AM-PAC short mobility form. At this time, no further PT is recommended upon discharge due to pt reaching baseline level of PLOF. Pt demonstrates independent bed mobility, transfers, and ambulation suitable for return to previous home environment. Pt no longer therapy services as discharge goals were met at this time. Recommend patient returns to prior setting with prior services. PT Equipment Recommendations  Equipment Needed: No    Assessment   Assessment: Pt demonstrates independent bed mobility, transfers and ambulation during this visit. Pt BP increases after activity without symptoms of lightheadedness and dizziness. Pt demonstrates functional mobility and ADL ability for discharge and demonstrates PLOF at baseline  Treatment Diagnosis: Decreased Functional Mobility  Prognosis: Good  Decision Making: Low Complexity  Clinical Presentation: Stable  PT Education: Goals; General Safety;PT Role;Plan of Care;Home Exercise Program;Injury Prevention; Functional Mobility Training  Patient Education: Pt verbalized understanding of PT education  No Skilled PT: Independent with functional mobility   REQUIRES PT FOLLOW UP: No  Activity Tolerance  Activity Tolerance: Patient Tolerated treatment well  Activity Tolerance: Pt BP was checked after transitions from supine to EOB (174/80), EOB to standing (182/84) and after ambulation (172/75). Pt tolerated transfers, bed mobility, ambulation without symptoms of dizziness or lightheadedness       Patient Diagnosis(es): The primary encounter diagnosis was Near syncope. Diagnoses of Hyponatremia, History of coronary artery disease, Acute epigastric pain, and Essential hypertension were also pertinent to this visit.      has a past medical history of Cerebral artery occlusion with cerebral infarction [facility]), Croup in pediatric patient, Hyperlipidemia, and Hypertension. has a past surgical history that includes hernia repair and Cardiac surgery. Restrictions  Restrictions/Precautions  Restrictions/Precautions: Fall Risk  Position Activity Restriction  Other position/activity restrictions: 56 y.o. male with pertinent past medical history of hypertension, coronary artery disease, prior CVA with residual speech deficit who was brought in by EMS transportation for elevated blood pressure. Patient states he took her blood pressure at home and each arm and noted his systolic was over 650. He has been taking his medications. Patient also reports 5 to 6 days of dizziness, states he feels he is about to pass out, nothing seems to bring on these episodes or make them worse. Associated with some mild right-sided neck pain. Patient also reports epigastric burning abdominal pain which he attributes to \"indigestion. \"   Initial troponins are negative from ER. Pt to be admitted for futher workup and improved BP control. Pt also noted to have some mild hyponatremia on admt  Vision/Hearing  Vision: Impaired  Vision Exceptions: Wears glasses for reading  Hearing: Within functional limits     Subjective  General  Chart Reviewed: Yes  Patient assessed for rehabilitation services?: Yes  Family / Caregiver Present: No  Diagnosis: 56 y.o. male with pertinent past medical history of hypertension, coronary artery disease, prior CVA with residual speech deficit who was brought in by EMS transportation for elevated blood pressure. Patient states he took her blood pressure at home and each arm and noted his systolic was over 144. He has been taking his medications. Patient also reports 5 to 6 days of dizziness, states he feels he is about to pass out, nothing seems to bring on these episodes or make them worse. Associated with some mild right-sided neck pain. Patient also reports epigastric burning abdominal pain which he attributes to \"indigestion. \"   Initial troponins are negative from ER. Pt to be admitted for futher workup and improved BP control. Pt also noted to have some mild hyponatremia on admt  Follows Commands: Within Functional Limits  Subjective  Subjective: Pt found seated in bed, agreeable to therapy this visit  Pain Screening  Patient Currently in Pain: Denies  Vital Signs  BP: (!) 157/95  BP Location: Right upper arm  BP Upper/Lower: Upper       Orientation  Orientation  Overall Orientation Status: Within Normal Limits  Social/Functional History  Social/Functional History  Lives With: Alone  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, workout  Additional Comments: No falls in past 6 months  Cognition        Objective     Observation/Palpation  Posture: Good    AROM RLE (degrees)  RLE AROM: WFL  AROM LLE (degrees)  LLE AROM : WFL  Strength RLE  Strength RLE: WFL  Strength LLE  Strength LLE: WFL        Bed mobility  Rolling to Right: Independent  Supine to Sit: Independent  Scooting: Independent  Transfers  Sit to Stand: Independent  Ambulation  Ambulation?: Yes  Ambulation 1  Surface: level tile  Device: No Device  Assistance: Independent  Gait Deviations: Slow Kristin; Shuffles  Distance: ~270 ft  Stairs/Curb  Stairs?: No     Balance  Sitting - Static: Good  Sitting - Dynamic: Good  Standing - Static: Good  Standing - Dynamic: Good  Comments: Pt demonstrates sitting balance in bed and at EOB with loss of balance or corrections in balance, standing balance (dynamic/static) demonstrated without loss in balance        Plan   Plan  Times per week: None  Plan Comment: Pt has met functional mobility goals for discharge at this time, no longer requires the need for skilled therapy at this time. Safety Devices  Type of devices: All fall risk precautions in place, Bed alarm in place, Patient at risk for falls, Left in bed, Nurse notified, Call light within reach  Restraints  Initially in place: No    G-Code       OutComes Score                                                  AM-PAC Score  AM-PAC Inpatient Mobility Raw Score : 24 (10/05/20 0952)  AM-PAC Inpatient T-Scale Score : 61.14 (10/05/20 0952)  Mobility Inpatient CMS 0-100% Score: 0 (10/05/20 0952)  Mobility Inpatient CMS G-Code Modifier : 509 [address] (10/05/20 5219)          Goals  Short term goals  Time Frame for Short term goals: Discharge  Short term goal 1: Pt has met goals for discharge at this time       Therapy Time   Individual Concurrent Group Co-treatment   Time In 9708         Time Out 0921         Minutes 43         Timed Code Treatment Minutes: [address], SPT    PT providing direct supervision during session and assisting in making skilled judgements throughout session.   [id number] [place] Lerma PT, DPT [id number]    [id number] Markus Lerma, PT

## 2020-10-05 NOTE — ED PROVIDER NOTES
EMERGENCY DEPARTMENT PROVIDER NOTE    Patient Identification  Pt Name: Maria R Bernal  MRN: [id number]  [place] 1960  Date of evaluation: 10/5/2020  Provider: Ladonna Trujillo DO  PCP: Hugh Aparicio MD    Chief Complaint  Hypertension (pt. states he was having neck and back pain at home and felt like his b/p was elevated. pt. checked b/p at home and it 200'2 sys. pt. arrived by [facility]. )      HPI  (History provided by patient)  This is a 56 y.o. male with pertinent past medical history of hypertension, coronary artery disease, prior CVA with residual speech deficit who was brought in by EMS transportation for elevated blood pressure. Patient states he took her blood pressure at home and each arm and noted his systolic was over 335. He has been taking his medications. Patient also reports 5 to 6 days of dizziness, states he feels he is about to pass out, nothing seems to bring on these episodes or make them worse. Associated with some mild right-sided neck pain. Patient also reports epigastric burning abdominal pain which he attributes to \"indigestion. \"     ROS    Const:  No fevers, no chills, no generalized weakness, +lightheadedness  Skin:  No rash, no lesions  Eyes:  No visual changes, no blurry or double vision, no pain  ENT:  No sore throat, no difficulty swallowing, no ear pan, no sinus pain or congestion  Card:  No chest pain, no palpitations, no edema  Resp:  No shortness of breath, no cough, no wheezing  Abd:  +abdominal pain, no nausea, no vomiting, no diarrhea  Genitourinary:  No dysuria, no hematuria  MSK:  +joint pain, no myalgia  Neuro:  No focal weakness, no headache, no paresthesia    All other systems reviewed and negative unless otherwise noted in HPI        I have reviewed the following nursing documentation:  Allergies: Lipitor [atorvastatin]; Pentazocine; Sulfa antibiotics;  Sulfur; and Viagra [sildenafil citrate]    Past medical history:   Past Medical History: extremities. No pronator drift. Normal finger to nose, normal heel to shin. Downward Babinskis. Skin: Warm and dry. No rash. Psychiatric: Normal mood and affect. Behavior is normal.    Procedures      EKG    EKG was reviewed by emergency department physician in the absence of a cardiologist    Narrow complex sinus rhythm, rate 65, normal axis, normal TN and QRS intervals, normal Qtc, no ST elevations or depressions, normal t-wave morphology, impression NSR, no STEMI      Radiology  XR CHEST (2 VW)   Final Result   No acute cardiopulmonary findings.              Labs  Results for orders placed or performed during the hospital encounter of 10/05/20   CBC Auto Differential   Result Value Ref Range    WBC 5.4 4.0 - 11.0 K/uL    RBC 4.28 4.20 - 5.90 M/uL    Hemoglobin 14.2 13.5 - 17.5 g/dL    Hematocrit 41.1 40.5 - 52.5 %    MCV 96.2 80.0 - 100.0 fL    MCH 33.3 26.0 - 34.0 pg    MCHC 34.6 31.0 - 36.0 g/dL    RDW 14.9 12.4 - 15.4 %    Platelets 847 950 - 559 K/uL    MPV 8.1 5.0 - 10.5 fL    Neutrophils % 68.0 %    Lymphocytes % 21.5 %    Monocytes % 7.2 %    Eosinophils % 2.8 %    Basophils % 0.5 %    Neutrophils Absolute 3.7 1.7 - 7.7 K/uL    Lymphocytes Absolute 1.2 1.0 - 5.1 K/uL    Monocytes Absolute 0.4 0.0 - 1.3 K/uL    Eosinophils Absolute 0.2 0.0 - 0.6 K/uL    Basophils Absolute 0.0 0.0 - 0.2 K/uL   Basic Metabolic Panel w/ Reflex to MG   Result Value Ref Range    Sodium 131 (L) 136 - 145 mmol/L    Potassium reflex Magnesium 3.9 3.5 - 5.1 mmol/L    Chloride 94 (L) 99 - 110 mmol/L    CO2 25 21 - 32 mmol/L    Anion Gap 12 3 - 16    Glucose 101 (H) 70 - 99 mg/dL    BUN 6 (L) 7 - 20 mg/dL    CREATININE 0.7 (L) 0.8 - 1.3 mg/dL    GFR Non-African American >60 >60    GFR African American >60 >60    Calcium 9.5 8.3 - 10.6 mg/dL   Troponin   Result Value Ref Range    Troponin <0.01 <0.01 ng/mL   Hepatic Function Panel   Result Value Ref Range    Total Protein 7.1 6.4 - 8.2 g/dL    Alb 4.6 3.4 - 5.0 g/dL    Alkaline Phosphatase 83 40 - 129 U/L    ALT 16 10 - 40 U/L    AST 24 15 - 37 U/L    Total Bilirubin 0.3 0.0 - 1.0 mg/dL    Bilirubin, Direct <0.2 0.0 - 0.3 mg/dL    Bilirubin, Indirect see below 0.0 - 1.0 mg/dL   Lipase   Result Value Ref Range    Lipase 39.0 13.0 - 60.0 U/L   EKG 12 Lead   Result Value Ref Range    Ventricular Rate 65 BPM    Atrial Rate 65 BPM    P-R Interval 136 ms    QRS Duration 82 ms    Q-T Interval 398 ms    QTc Calculation (Bazett) 413 ms    P Axis 54 degrees    R Axis -5 degrees    T Axis 15 degrees    Diagnosis       Normal sinus rhythmWhen compared with ECG of 25-DEC-2018 23:33,Sinus rhythm has replaced Atrial fibrillationRight bundle branch block is no longer Present       Screenings           MDM and ED Course    Patient afebrile and nontoxic. No distress. EKG is without evidence of acute ischemia, troponin is normal, ACS or malignant dysrhythmia is not immediately evident. Neuro exam is nonfocal aside from patient's mild dysarthria which is chronic and unchanged, no findings to suggest acute CVA/TIA. Very low suspicion for dissection. No tachycardia or respiratory symptoms to suggest pulmonary embolism and this is not most likely diagnosis. Chest x-ray evidence of pneumonia or pneumothorax. No peritoneal signs on abdominal exam, no lower abdominal tenderness to suggest acute appendicitis. Lipase normal, no pancreatitis. Suspect abdominal pain related to gastritis, did improve temporarily with GI cocktail in the emergency department. Lab work-up without evidence of endorgan damage, patient does have hyponatremia which appears acute and may be contributing to his lightheadedness. Patient's near syncopal episodes with history of coronary artery disease also concerning. I discussed findings with patient and his daughter, they do wish to remain in the hospital for observation at this time and I feel this is reasonable.   Case discussed with Dr. Lois Gresham who will admit for further evaluation and

## 2020-10-05 NOTE — PROGRESS NOTES
Patient states that he does not eat during the day because it is difficult for him to swallow and that the food generally gets stuck in his chest (esophagus). States that he usually eats around 2200 before going to bed.

## 2020-10-05 NOTE — PROGRESS NOTES
Pt admitted from ER. VS stable, assessment complete as charted. Denies pain at this time. Oriented to room and surroundings. Call light in reach, will continue to monitor.

## 2020-10-05 NOTE — H&P
History and Physical  Dr. Laura Kramer  10/5/2020    PCP: Mary Block MD    Cc:   Chief Complaint   Patient presents with    Hypertension     pt. states he was having neck and back pain at home and felt like his b/p was elevated. pt. checked b/p at home and it 200'2 sys. pt. arrived by tam ha. HPI:  Irene Starr is a 56 y.o. male who has a past medical history of Cerebral artery occlusion with cerebral infarction ([address] Croup in pediatric patient, Hyperlipidemia, and Hypertension. Patient presents with Hypertensive emergency. HPI of presenting complaint in blockformat: (1-3 for expanded problem focused, ?4 for detailed/comprehensive)   Location: dizziness in head  Duration: 5-6d  Timing: noted when bp was high  Context: 56 y.o. male with pertinent past medical history of hypertension, coronary artery disease, prior CVA with residual speech deficit who was brought in by EMS transportation for elevated blood pressure. Patient states he took her blood pressure at home and each arm and noted his systolic was over 597. He has been taking his medications. Patient also reports 5 to 6 days of dizziness, states he feels he is about to pass out, nothing seems to bring on these episodes or make them worse. Associated with some mild right-sided neck pain. Patient also reports epigastric burning abdominal pain which he attributes to \"indigestion. \"   Initial troponins are negative from ER. Pt to be admitted for futher workup and improved BP control. Pt also noted to have some mild hyponatremia on admt  Severity: moderate  Quality: feels like room is spinning  Modifying Factors: nothing makes it better or worse  Associated Signs or Symptoms: +cp. No sob. No n/v         Problem list of hospitalization thus far:   Active Hospital Problems    Diagnosis    Syncope, near [R55]    Hyponatremia [E87.1]    Hypertensive emergency [I16.1]    Mixed hyperlipidemia [E78.2]         Review of Systems: (1 system for EPF, 2-9 for detailed, 10+ for comprehensive)  Review of Systems   Constitutional: Positive for chills and fever. HENT: Negative for ear discharge and facial swelling. Eyes: Negative for pain and redness. Respiratory: Positive for chest tightness. Negative for shortness of breath. Cardiovascular: Negative for chest pain and leg swelling. Gastrointestinal: Positive for abdominal pain. Endocrine: Negative for polydipsia and polyphagia. Genitourinary: Negative for frequency and urgency. Musculoskeletal: Positive for neck pain. Allergic/Immunologic: Negative for food allergies. Neurological: Positive for dizziness. Negative for seizures. Hematological: Negative for adenopathy. Psychiatric/Behavioral: Negative for behavioral problems and dysphoric mood. Past Medical History:   Past Medical History:   Diagnosis Date    Cerebral artery occlusion with cerebral infarction ([facility]     Croup in pediatric patient     Hyperlipidemia     Hypertension        Past Surgical History:   Past Surgical History:   Procedure Laterality Date    CARDIAC SURGERY      HERNIA REPAIR         Social History:   Social History     Tobacco History     Smoking Status  Former Smoker Quit date  12/22/2018 Smoking Frequency  1 pack/day Smoking Tobacco Type  Cigarettes    Smokeless Tobacco Use  Never Used    Tobacco Comment  using electronic cigarette as of 12/17/14          Alcohol History     Alcohol Use Status  Yes Comment  rarely          Drug Use     Drug Use Status  No          Sexual Activity     Sexually Active  Not Asked                Fam History:   Family History   Problem Relation Age of Onset    Cancer Mother     Heart Disease Father        PFSH: The above PMHx, PSHx, SocHx, FamHx has been reviewed by myself.  (1 area for detailed, 2-3 for comprehensive)      Code Status: Full Code    Meds - following list of home medications fromelectronic chart has been reviewed by myself  Prior to Admission medications    Medication Sig Start Date End Date Taking? Authorizing Provider   triamterene-hydroCHLOROthiazide (MAXZIDE-25) 37.5-25 MG per tablet Take 0.5 tablets by mouth daily   Yes Historical Provider, MD   losartan (COZAAR) 25 MG tablet TAKE 1 TABLET BY MOUTH EVERY DAY 7/15/20  Yes ERIKA Roach - CNP   aspirin 81 MG chewable tablet Take 1 tablet by mouth daily 12/28/18  Yes Ori Tipton MD   FLUoxetine (PROZAC) 40 MG capsule Take 40 mg by mouth daily. Yes Historical Provider, MD         Allergies   Allergen Reactions    Lipitor [Atorvastatin] Other (See Comments)     Neck stiffness    Pentazocine     Sulfa Antibiotics Itching    Sulfur     Viagra [Sildenafil Citrate] Other (See Comments)     Visual changes             EXAM: (2-7 system for EPF/Detailed, ?8 for Comprehensive)  BP (!) 182/73   Pulse 71   Temp 97.5 °F (36.4 °C) (Temporal)   Resp 16   Ht 5' 8\" (1.727 m)   Wt 160 lb 11.5 oz (72.9 kg)   SpO2 99%   BMI 24.44 kg/m²   Constitutional: vitals as above: alert, appears stated age and cooperative  Psychiatric: normal insight and judgment, oriented to person, place, time, and general circumstances  Head: Normocephalic, without obvious abnormality, atraumatic  Eyes:lids and lashes normal, conjunctivae and sclerae normal and pupils equal, round, reactive to light and accomodation  EMNT: extenal ears normal, lips normal  Neck: no adenopathy, supple, symmetrical, trachea midline and thyroid not enlarged, symmetric, no tenderness/mass/nodules   Respiratory: clear to auscultation and percussion bilaterally with normal respiratory effort  Cardiovascular: normal rate, regular rhythm, normal S1 and S2 and no gallops  Gastrointestinal: soft, non-tender, non-distended, normal bowel sounds, no masses or organomegaly  Lymphatic:   Extremities: no edema, no clubbing  Skin:No rashes or nodules noted.   Neurologic:    LABS:  Labs Reviewed   BASIC METABOLIC PANEL W/ REFLEX TO MG FOR LOW K - Abnormal; Notable for the following components:       Result Value    Sodium 131 (*)     Chloride 94 (*)     Glucose 101 (*)     BUN 6 (*)     CREATININE 0.7 (*)     All other components within normal limits    Narrative:     Performed at:  [facility] [address],  [facility], [address] [facility]   Phone [phone]   CBC WITH AUTO DIFFERENTIAL    Narrative:     Performed at:  [facility] [address],  [facility], [facility]   Phone [phone]   TROPONIN    Narrative:     Performed at:  [facility] [address],  [facility], [facility]   Phone [phone]   HEPATIC FUNCTION PANEL    Narrative:     Performed at:  [facility] [address],  [facility], [facility]   Phone [phone]   LIPASE    Narrative:     Performed at:  [facility] [address],  [facility], [facility]   Phone [phone]   TROPONIN    Narrative:     Performed at:  [facility] [address],  [facility], [facility]   Phone [phone]   TROPONIN   TROPONIN   TROPONIN         IMAGING:  Imaging results from the ER have been reviewed in the computerized chart. Xr Chest (2 Vw)    Result Date: 10/5/2020  EXAMINATION: TWO XRAY VIEWS OF THE CHEST 10/5/2020 2:28 am COMPARISON: 12/25/2018 HISTORY: ORDERING SYSTEM PROVIDED HISTORY: epigastric pain, chest pain TECHNOLOGIST PROVIDED HISTORY: Reason for exam:->epigastric pain, chest pain Initial encounter FINDINGS: No focal consolidation, pleural effusion or pneumothorax. The cardiomediastinal silhouette is stable. No overt pulmonary edema. The osseous structures are stable. No acute cardiopulmonary findings. EKG: from ER, interpreted by self, sinus rhythm at 65. No acute st elevation. No TWI noted.  Comparison made to ekg in old chart dated 12/25/18, shows similar form, rate higher at [de-identified]          MEDICAL DECISION MAKING:    Principal Problem:     Hypertensive emergency -New Problem to me. SBP as high as 200s at home, 182/73 here  Plan: Pt home BP meds reviewed and will be continued. IV Hydralazine ordered for control of extremely high blood pressures. Cozaar dose increased. Will monitor labs to assess Creat/K for possible complications of medications. ECHO ordered. Active Problems:    Mixed hyperlipidemia -Established problem. Stable. Plan: stay on statin    Syncope, near -New Problem to me. Unclear is this is due to elevated BP, but seems likely. Hypvolemia also possible. Plan: ECHO. Better BP control. Hold diuretic    Hyponatremia -Established problem. Stable. Na 131  Plan: hold hctz. IVF ordered. Repeat Na in 12 hr          Diagnoses as listed above, designated as new or established and plan outlined for each. Data Reviewed:   (1) Lab tests were reviewed or ordered. (1) Radiology tests were reviewed or ordered. (1) Medical test (Echo, EKG, PFT/lara) were ordered. (1)History was not obtained from someone other than patient  (1) Old records  were reviewed - see HPI/MDM for pertinent details if review done. (2) Case wasdiscussed with another health care provider: Angela RHOADES  (2) Imaging was viewed by myself. (2) EKG  was viewed by myself. The patient isbeing placed in inpatient status with the expectation of requiring a hospital stay spanning at least two midnights for care and treatment of the problems noted in the problem list.  This determination is also based on thepatients comorbidities and past medical history, the severity and timing of the signs and symptoms upon presentation.         Electronically signed by: Chino Clemente 10/5/2020

## 2020-10-06 VITALS
WEIGHT: 155.6 LBS | TEMPERATURE: 97.4 F | SYSTOLIC BLOOD PRESSURE: 139 MMHG | OXYGEN SATURATION: 94 % | BODY MASS INDEX: 23.58 KG/M2 | RESPIRATION RATE: 16 BRPM | HEART RATE: 70 BPM | HEIGHT: 68 IN | DIASTOLIC BLOOD PRESSURE: 64 MMHG

## 2020-10-06 LAB
A/G RATIO: 1.8 (ref 1.1–2.2)
ALBUMIN SERPL-MCNC: 4 G/DL (ref 3.4–5)
ALP BLD-CCNC: 70 U/L (ref 40–129)
ALT SERPL-CCNC: 14 U/L (ref 10–40)
ANION GAP SERPL CALCULATED.3IONS-SCNC: 8 MMOL/L (ref 3–16)
AST SERPL-CCNC: 16 U/L (ref 15–37)
BASOPHILS ABSOLUTE: 0 K/UL (ref 0–0.2)
BASOPHILS RELATIVE PERCENT: 0.6 %
BILIRUB SERPL-MCNC: 0.4 MG/DL (ref 0–1)
BUN BLDV-MCNC: 7 MG/DL (ref 7–20)
CALCIUM SERPL-MCNC: 9.2 MG/DL (ref 8.3–10.6)
CHLORIDE BLD-SCNC: 100 MMOL/L (ref 99–110)
CO2: 27 MMOL/L (ref 21–32)
CREAT SERPL-MCNC: 0.8 MG/DL (ref 0.8–1.3)
EOSINOPHILS ABSOLUTE: 0.1 K/UL (ref 0–0.6)
EOSINOPHILS RELATIVE PERCENT: 2.8 %
GFR AFRICAN AMERICAN: >60
GFR NON-AFRICAN AMERICAN: >60
GLOBULIN: 2.2 G/DL
GLUCOSE BLD-MCNC: 117 MG/DL (ref 70–99)
HCT VFR BLD CALC: 37.9 % (ref 40.5–52.5)
HEMOGLOBIN: 13 G/DL (ref 13.5–17.5)
LYMPHOCYTES ABSOLUTE: 1.1 K/UL (ref 1–5.1)
LYMPHOCYTES RELATIVE PERCENT: 22.2 %
MCH RBC QN AUTO: 32.7 PG (ref 26–34)
MCHC RBC AUTO-ENTMCNC: 34.4 G/DL (ref 31–36)
MCV RBC AUTO: 95.1 FL (ref 80–100)
MONOCYTES ABSOLUTE: 0.5 K/UL (ref 0–1.3)
MONOCYTES RELATIVE PERCENT: 9.2 %
NEUTROPHILS ABSOLUTE: 3.3 K/UL (ref 1.7–7.7)
NEUTROPHILS RELATIVE PERCENT: 65.2 %
PDW BLD-RTO: 14.8 % (ref 12.4–15.4)
PLATELET # BLD: 230 K/UL (ref 135–450)
PMV BLD AUTO: 8.6 FL (ref 5–10.5)
POTASSIUM REFLEX MAGNESIUM: 4.1 MMOL/L (ref 3.5–5.1)
RBC # BLD: 3.99 M/UL (ref 4.2–5.9)
REASON FOR REJECTION: NORMAL
REJECTED TEST: NORMAL
SODIUM BLD-SCNC: 135 MMOL/L (ref 136–145)
TOTAL PROTEIN: 6.2 G/DL (ref 6.4–8.2)
WBC # BLD: 5 K/UL (ref 4–11)

## 2020-10-06 PROCEDURE — 80053 COMPREHEN METABOLIC PANEL: CPT

## 2020-10-06 PROCEDURE — 94760 N-INVAS EAR/PLS OXIMETRY 1: CPT

## 2020-10-06 PROCEDURE — G0378 HOSPITAL OBSERVATION PER HR: HCPCS

## 2020-10-06 PROCEDURE — 2580000003 HC RX 258: Performed by: INTERNAL MEDICINE

## 2020-10-06 PROCEDURE — 85025 COMPLETE CBC W/AUTO DIFF WBC: CPT

## 2020-10-06 PROCEDURE — 6370000000 HC RX 637 (ALT 250 FOR IP): Performed by: INTERNAL MEDICINE

## 2020-10-06 PROCEDURE — 36415 COLL VENOUS BLD VENIPUNCTURE: CPT

## 2020-10-06 RX ORDER — LOSARTAN POTASSIUM 50 MG/1
50 TABLET ORAL DAILY
Qty: 30 TABLET | Refills: 1 | Status: SHIPPED | OUTPATIENT
Start: 2020-10-06 | End: 2021-01-27 | Stop reason: SDUPTHER

## 2020-10-06 RX ADMIN — Medication 10 ML: at 08:20

## 2020-10-06 RX ADMIN — LOSARTAN POTASSIUM 50 MG: 25 TABLET, FILM COATED ORAL at 08:19

## 2020-10-06 RX ADMIN — FLUOXETINE 40 MG: 20 CAPSULE ORAL at 08:19

## 2020-10-06 RX ADMIN — ASPIRIN 81 MG: 81 TABLET, CHEWABLE ORAL at 08:19

## 2020-10-06 ASSESSMENT — PAIN SCALES - GENERAL
PAINLEVEL_OUTOF10: 0

## 2020-10-06 NOTE — PROGRESS NOTES
CLINICAL PHARMACY NOTE: MEDS TO [address] [facility] Select Patient?: No  Total # of Prescriptions Filled: 1   The following medications were delivered to the patient:  · Losartan  Total # of Interventions Completed: 0  Time Spent (min): 60    Additional Documentation:  Patient picked up in [address] [place] [address]

## 2020-10-06 NOTE — DISCHARGE SUMMARY
[facility] -- Physician Discharge Summary     Latha Current  1960  MRN: [id number]    Admit Date: 10/5/2020  Discharge Date: No discharge date for patient encounter. Attending MD: Sofi Suero MD  Discharging MD: Sofi Suero MD  PCP: MD [id number] / [place] [id number] [phone]    Admission Diagnosis: Syncope, near [R55]  Syncope, near [R55]  DISCHARGE DIAGNOSIS: hypertensive emergency    Full Hospital Problem List:  [address] Problems    Diagnosis Date Noted    Syncope, near [R55] 10/05/2020    Hyponatremia [E87.1] 10/05/2020    Hypertensive emergency [I16.1] 10/05/2020    Mixed hyperlipidemia [E78.2] 01/04/2019           Hospital Course:  56 y. o. male with pertinent past medical history of hypertension, coronary artery disease, prior CVA with residual speech deficit who was brought in by EMS transportation for elevated blood pressure.  Patient states he took her blood pressure at home and each arm and noted his systolic was over 535.  He has been taking his medications.  Patient also reports 5 to 6 days of dizziness, states he feels he is about to pass out, nothing seems to bring on these episodes or make them worse.  Associated with some mild right-sided neck pain.  Patient also reports epigastric burning abdominal pain which he attributes to \"indigestion. \"   Initial troponins are negative from ER. Pt to be admitted for futher workup and improved BP control. Pt also noted to have some mild hyponatremia on admt    Symptoms seem to be due to HTN  Pt admits that he does not take meds regularly if at all  Resumed on cozaar, now at 50  BP is better, -150    ECHO is done while here   Summary   -Normal left ventricle size, wall thickness and systolic function with an   estimated ejection fraction of 55-60%. -No regional wall motion abnormalities are seen.   -Normal diastolic function.  E/e' = 9.1.   -The right ventricle appears mildly dilated. -The right atrium is mildly dilated. -Mild mitral regurgitation.   -Mild tricuspid regurgitation.   -Estimated pulmonary artery systolic pressure is elevated at 42 mmHg   assuming a right atrial pressure of 3 mmHg. As BP is better and pt is asymptomatic, to go home  Asked to see PCP in 2wk for BP recheck       Consults made during Hospitalization:  IP CONSULT TO PRIMARY CARE PROVIDER    Treatment team at time of Discharge: Treatment Team: Attending Provider: Gerardo Naik MD; Registered Nurse: Eliazar Irving, RN; Respiratory Therapist (Night): Tammie Amaya RCP; Registered Nurse: Baldemar Olea, WINSTON; Unit Clerk: Vannesa Silva    Imaging Results:  Xr Chest (2 Vw)    Result Date: 10/5/2020  EXAMINATION: TWO XRAY VIEWS OF THE CHEST 10/5/2020 2:28 am COMPARISON: 12/25/2018 HISTORY: ORDERING SYSTEM PROVIDED HISTORY: epigastric pain, chest pain TECHNOLOGIST PROVIDED HISTORY: Reason for exam:->epigastric pain, chest pain Initial encounter FINDINGS: No focal consolidation, pleural effusion or pneumothorax. The cardiomediastinal silhouette is stable. No overt pulmonary edema. The osseous structures are stable. No acute cardiopulmonary findings.          Discharge Exam:  BP (!) 149/64   Pulse 86   Temp 97.8 °F (36.6 °C) (Temporal)   Resp 16   Ht 5' 8\" (1.727 m)   Wt 155 lb 9.6 oz (70.6 kg)   SpO2 100%   BMI 23.66 kg/m²   General appearance: alert, appears stated age and cooperative  Head: Normocephalic, without obvious abnormality, atraumatic  Lungs: clear to auscultation bilaterally  Heart: regular rate and rhythm, S1, S2 normal, no murmur, click, rub or gallop  Abdomen: soft, non-tender; bowel sounds normal; no masses,  no organomegaly  Extremities: extremities normal, atraumatic, no cyanosis or edema    Disposition: home    Condition: stable    Discharge Medications:   Danny Herrera   Home Medication Instructions TVI:[id number]    Printed on:10/06/20 2587

## 2020-10-06 NOTE — PLAN OF CARE
Problem: Cardiac Output - Decreased:  Description: Pulse rate and rhythm, peripheral pulses, and capillary refill assessed every shift with assessment. General color and body temperature monitored throughout shift and with vitals. Assess for edema with head to toe assessment. Administer treatments and medications as ordered. Monitor patient's weight. Goal: Hemodynamic stability will improve  Description: Pulse rate and rhythm, peripheral pulses, and capillary refill assessed every shift with assessment. General color and body temperature monitored throughout shift and with vitals. Assess for edema with head to toe assessment. Administer treatments and medications as ordered. Monitor patient's weight. Hemodynamic stability will improve  10/5/2020 2153 by aCrolina Philip RN  Outcome: Ongoing  Note: Pt admitted for hypertensive urgency. Pt BP still elevated. PRN blood pressure medications. Pt anxious, causing BP to elevated. Will continue to monitor. Call light within reach. Problem: Falls - Risk of:  Goal: Will remain free from falls  Description: Will remain free from falls  Outcome: Ongoing  Note: Pt has remained free from any falls so far this shift. Pt admitted for dizziness. Pt denies any dizziness so far this shift. Pt refuses bed alarm. Non-skid socks on. Bed in lowest and locked position. Belongings within reach. Will continue to monitor. Call  light within reach.

## 2021-01-27 ENCOUNTER — OFFICE VISIT (OUTPATIENT)
Dept: CARDIOLOGY CLINIC | Age: 61
End: 2021-01-27
Payer: MEDICARE

## 2021-01-27 VITALS
SYSTOLIC BLOOD PRESSURE: 138 MMHG | HEART RATE: 84 BPM | WEIGHT: 160.7 LBS | DIASTOLIC BLOOD PRESSURE: 80 MMHG | HEIGHT: 68 IN | BODY MASS INDEX: 24.35 KG/M2 | RESPIRATION RATE: 21 BRPM | OXYGEN SATURATION: 98 %

## 2021-01-27 DIAGNOSIS — E78.2 MIXED HYPERLIPIDEMIA: ICD-10-CM

## 2021-01-27 DIAGNOSIS — I10 ESSENTIAL HYPERTENSION: ICD-10-CM

## 2021-01-27 DIAGNOSIS — R00.2 PALPITATIONS: ICD-10-CM

## 2021-01-27 DIAGNOSIS — I25.83 CORONARY ARTERY DISEASE DUE TO LIPID RICH PLAQUE: ICD-10-CM

## 2021-01-27 DIAGNOSIS — R42 DIZZINESS: Primary | ICD-10-CM

## 2021-01-27 DIAGNOSIS — F17.200 SMOKING: ICD-10-CM

## 2021-01-27 DIAGNOSIS — I25.10 CORONARY ARTERY DISEASE DUE TO LIPID RICH PLAQUE: ICD-10-CM

## 2021-01-27 PROCEDURE — 99214 OFFICE O/P EST MOD 30 MIN: CPT | Performed by: INTERNAL MEDICINE

## 2021-01-27 RX ORDER — LOSARTAN POTASSIUM 50 MG/1
50 TABLET ORAL DAILY
Qty: 90 TABLET | Refills: 4 | Status: SHIPPED | OUTPATIENT
Start: 2021-01-27 | End: 2021-10-18 | Stop reason: ALTCHOICE

## 2021-01-27 RX ORDER — ROSUVASTATIN CALCIUM 10 MG/1
10 TABLET, COATED ORAL DAILY
Qty: 90 TABLET | Refills: 1 | Status: SHIPPED | OUTPATIENT
Start: 2021-01-27 | End: 2021-07-30

## 2021-01-27 SDOH — HEALTH STABILITY: MENTAL HEALTH: HOW OFTEN DO YOU HAVE A DRINK CONTAINING ALCOHOL?: MONTHLY OR LESS

## 2021-01-27 NOTE — PROGRESS NOTES
[facility]  Cardiac Consult     Referring Provider:  Alyce Esposito MD     Chief Complaint   Patient presents with    6 Month Follow-Up    Coronary Artery Disease    Hyperlipidemia        History of Present Illness:  56 y.o. male with cardiac arrest with inferior MI 2018 who presents for f/u. PCI on RCA with non obstructive disease of other vessels. GXT non-diagnostic due to HR. He has multiple complaints. No chest pain. He got new teeth and has difficult talking and is very difficult to understand. His major complaint is dizziness. Present most of the time. Appears to have been present for months. I cannot see that much evaluation has been done. He was admitted for HTN. BP at home when he checks it is usually systolic 490-147. No hypotension. No associated symptoms. He has had some palpitations/fluttering recently that lasts minutes, but is not associated with his dizziness. He is depressed as his wife  suddenly. Past Medical History:   has a past medical history of Cerebral artery occlusion with cerebral infarction [facility]), Croup in pediatric patient, Hyperlipidemia, and Hypertension. Surgical History:   has a past surgical history that includes hernia repair and Cardiac surgery. Social History:  Social History     Tobacco Use    Smoking status: Former Smoker     Packs/day: 1.00     Types: Cigarettes     Quit date: 2018     Years since quittin.1    Smokeless tobacco: Never Used    Tobacco comment: using electronic cigarette as of 12   Substance Use Topics    Alcohol use: Yes     Alcohol/week: 2.0 standard drinks     Types: 2 Cans of beer per week     Frequency: Monthly or less     Drinks per session: 1 or 2     Binge frequency: Daily or almost daily     Comment: daily 2 beers        Family History:  family history includes Cancer in his mother; Heart Disease in his father.      Allergies:  Lipitor [atorvastatin], Pentazocine, Sulfa antibiotics, Sulfur, and Viagra [sildenafil citrate]     Home Medications:  Prior to Visit Medications    Medication Sig Taking? Authorizing Provider   losartan (COZAAR) 50 MG tablet Take 1 tablet by mouth daily Yes Beata Montalvo MD   aspirin 81 MG chewable tablet Take 1 tablet by mouth daily Yes Aida Lopez MD   FLUoxetine (PROZAC) 40 MG capsule Take 40 mg by mouth daily. Yes Historical Provider, MD       [x] Medications and dosages reviewed. ROS:  [x]Full ROS obtained and negative except as mentioned in HPI      Physical Examination:    Vitals:    01/27/21 1057 01/27/21 1104   BP: (!) 150/86 138/80   Site: Right Upper Arm Right Upper Arm   Position: Sitting Sitting   Cuff Size: Large Adult Large Adult   Pulse: 92 84   Resp: 22 21   SpO2: 97% 98%   Weight: 160 lb 11.2 oz (72.9 kg)    Height: 5' 8\" (1.727 m)         · GENERAL: Well developed, well nourished, No acute distress  · NEUROLOGICAL: Alert and oriented  · PSYCH: Anxious affect  · SKIN: Warm and dry, No visible rash,   · EYES: Pupils equal and round, Sclera non-icteric,   · HENT:  External ears and nose unremarkable, mucus membranes moist  · MUSCULOSKELETAL: Normal cephalic, neck supple  · CAROTID: Normal upstroke, no bruits  · CARDIAC: JVP normal, Normal PMI, Regular rate and rhythm, normal S1S2, No murmur, rub, or gallop  · RESPIRATORY: Normal respiratory effort, clear to auscultation bilaterally  · EXTREMITIES: No LE edema  · GASTROINTESTINAL: normal bowel sounds, soft, non-tender, No hepatomegaly     Testing below reviewed  ECHO 10/2020     Summary   -Normal left ventricle size, wall thickness and systolic function with an   estimated ejection fraction of 55-60%. -No regional wall motion abnormalities are seen.   -Normal diastolic function. E/e' = 9.1.   -The right ventricle appears mildly dilated. -The right atrium is mildly dilated.    -Mild mitral regurgitation.   -Mild tricuspid regurgitation.   -Estimated pulmonary artery systolic pressure is elevated at 42 mmHg   assuming a right atrial pressure of 3 mmHg    EKG 10/5/2020  NSR, normal    Assessment:     CAD:  Stable. Medical management  Inferior MI with arrest 12/2018. PCI RCA- Non obstructive other vessels. ECHO 12/2018 EF=60% with inferior HK    HTN:  /80 (Site: Right Upper Arm, Position: Sitting, Cuff Size: Large Adult)   Pulse 84   Resp 21   Ht 5' 8\" (1.727 m)   Wt 160 lb 11.2 oz (72.9 kg)   SpO2 98%   BMI 24.43 kg/m²   Controlled. Continue current medications. Hypelipidemia:  He says he is intolerant to statins. Neck and back pain  Retry crestor 10  Labs 3 months (lipids, liver)    Smoking:  Says he quit with MI  Stable    Depression:  Stable. Worsens with lopressor  Unchanged    Dizziness:  Etiology unclear  Doubt cardiac  Check [facility]OT  He will check BP and HR with symptoms  Needs to see PCP  Also needs to see neurology.  He has seen Gabriela Hameed in the past    Plan:  [facility]OT  Follow BP and HR  F/u PCP and neuro  F/u me 6 weeks  Crestor 10  Lipids/lver 3 months      Thank you for allowing me to participate in the care of this individual.      Helga Mancera M.D., [facility]

## 2021-01-28 ENCOUNTER — NURSE ONLY (OUTPATIENT)
Dept: CARDIOLOGY CLINIC | Age: 61
End: 2021-01-28

## 2021-01-28 ENCOUNTER — HOSPITAL ENCOUNTER (OUTPATIENT)
Age: 61
Discharge: HOME | End: 2021-01-28
Payer: MEDICARE

## 2021-01-28 DIAGNOSIS — R42 DIZZINESS: Primary | ICD-10-CM

## 2021-01-28 DIAGNOSIS — E78.2 MIXED HYPERLIPIDEMIA: ICD-10-CM

## 2021-01-28 DIAGNOSIS — I25.10 CORONARY ARTERY DISEASE INVOLVING NATIVE CORONARY ARTERY OF NATIVE HEART WITHOUT ANGINA PECTORIS: ICD-10-CM

## 2021-01-28 LAB
ALT SERPL-CCNC: 16 U/L (ref 10–40)
AST SERPL-CCNC: 18 U/L (ref 15–37)
CHOLESTEROL, TOTAL: 194 MG/DL (ref 0–199)
HDLC SERPL-MCNC: 61 MG/DL (ref 40–60)
LDL CHOLESTEROL CALCULATED: 106 MG/DL
TRIGL SERPL-MCNC: 136 MG/DL (ref 0–150)
VLDLC SERPL CALC-MCNC: 27 MG/DL

## 2021-01-28 PROCEDURE — 36415 COLL VENOUS BLD VENIPUNCTURE: CPT

## 2021-01-28 PROCEDURE — 80061 LIPID PANEL: CPT

## 2021-01-28 PROCEDURE — 84460 ALANINE AMINO (ALT) (SGPT): CPT

## 2021-01-28 PROCEDURE — 84450 TRANSFERASE (AST) (SGOT): CPT

## 2021-01-29 ENCOUNTER — TELEPHONE (OUTPATIENT)
Dept: CARDIOLOGY CLINIC | Age: 61
End: 2021-01-29

## 2021-03-02 DIAGNOSIS — R00.2 PALPITATIONS: ICD-10-CM

## 2021-03-02 DIAGNOSIS — R42 DIZZINESS: ICD-10-CM

## 2021-03-02 PROCEDURE — 93272 ECG/REVIEW INTERPRET ONLY: CPT | Performed by: INTERNAL MEDICINE

## 2021-03-05 ENCOUNTER — TELEPHONE (OUTPATIENT)
Dept: CARDIOLOGY CLINIC | Age: 61
End: 2021-03-05

## 2021-04-21 ENCOUNTER — OFFICE VISIT (OUTPATIENT)
Dept: CARDIOLOGY CLINIC | Age: 61
End: 2021-04-21
Payer: MEDICARE

## 2021-04-21 VITALS
DIASTOLIC BLOOD PRESSURE: 70 MMHG | OXYGEN SATURATION: 98 % | HEART RATE: 88 BPM | HEIGHT: 68 IN | WEIGHT: 148.9 LBS | BODY MASS INDEX: 22.57 KG/M2 | RESPIRATION RATE: 18 BRPM | SYSTOLIC BLOOD PRESSURE: 120 MMHG

## 2021-04-21 DIAGNOSIS — I25.10 CORONARY ARTERY DISEASE DUE TO LIPID RICH PLAQUE: ICD-10-CM

## 2021-04-21 DIAGNOSIS — F17.200 SMOKING: ICD-10-CM

## 2021-04-21 DIAGNOSIS — I25.83 CORONARY ARTERY DISEASE DUE TO LIPID RICH PLAQUE: ICD-10-CM

## 2021-04-21 DIAGNOSIS — R42 DIZZINESS: ICD-10-CM

## 2021-04-21 DIAGNOSIS — I10 ESSENTIAL HYPERTENSION: ICD-10-CM

## 2021-04-21 DIAGNOSIS — E78.2 MIXED HYPERLIPIDEMIA: ICD-10-CM

## 2021-04-21 DIAGNOSIS — I65.21 STENOSIS OF RIGHT CAROTID ARTERY: Primary | ICD-10-CM

## 2021-04-21 PROCEDURE — 99214 OFFICE O/P EST MOD 30 MIN: CPT | Performed by: INTERNAL MEDICINE

## 2021-04-21 RX ORDER — TRIAMTERENE AND HYDROCHLOROTHIAZIDE 37.5; 25 MG/1; MG/1
TABLET ORAL
COMMUNITY
Start: 2021-03-15

## 2021-04-21 RX ORDER — CIPROFLOXACIN 500 MG/1
TABLET, FILM COATED ORAL
COMMUNITY
Start: 2021-04-16 | End: 2021-10-18 | Stop reason: ALTCHOICE

## 2021-04-21 RX ORDER — TIZANIDINE 2 MG/1
TABLET ORAL
COMMUNITY
Start: 2021-03-24 | End: 2021-10-18 | Stop reason: ALTCHOICE

## 2021-04-21 NOTE — PROGRESS NOTES
[address]  Cardiac Consult     Referring Provider:  Charo Rubio MD     Chief Complaint   Patient presents with    Coronary Artery Disease     Patient return to office for 2.5 month follow up Crestor and Htn     Hyperlipidemia    Hypertension        History of Present Illness:  59 y.o. male with cardiac arrest with inferior MI 2018 who presents for f/u. PCI on RCA with non obstructive disease of other vessels. GXT non-diagnostic due to HR. He has multiple complaints. No chest pain. He got new teeth and has difficult talking and is very difficult to understand. He was seen a few weeks ago with dizziness. HOLTER placed and normal. BP has been normal. Exacerbated by turning his head. Also asking about carotid disease. Prior MRA with 40% stenosis. Past Medical History:   has a past medical history of Cerebral artery occlusion with cerebral infarction [facility]), Croup in pediatric patient, Hyperlipidemia, and Hypertension. Surgical History:   has a past surgical history that includes hernia repair and Cardiac surgery. Social History:  Social History     Tobacco Use    Smoking status: Former Smoker     Packs/day: 1.00     Types: Cigarettes     Quit date: 2018     Years since quittin.3    Smokeless tobacco: Never Used    Tobacco comment: using electronic cigarette as of 12   Substance Use Topics    Alcohol use: Yes     Alcohol/week: 4.0 standard drinks     Types: 2 Glasses of wine, 2 Cans of beer per week     Frequency: Monthly or less     Drinks per session: 1 or 2     Binge frequency: Daily or almost daily     Comment: daily 2 beers        Family History:  family history includes Cancer in his mother; Heart Disease in his father. Allergies:  Lipitor [atorvastatin], Pentazocine, Sulfa antibiotics, Sulfur, and Viagra [sildenafil citrate]     Home Medications:  Prior to Visit Medications    Medication Sig Taking?  Authorizing Provider triamterene-hydroCHLOROthiazide (MAXZIDE-25) 37.5-25 MG per tablet TAKE 1 TABLET BY MOUTH DAILY Yes Historical Provider, MD   tiZANidine (ZANAFLEX) 2 MG tablet TAKE 1 TABLET BY MOUTH EVERY 8 HOURS AS NEEDED Yes Historical Provider, MD   ciprofloxacin (CIPRO) 500 MG tablet TAKE 1 TABLET BY MOUTH TWICE DAILY Yes Historical Provider, MD   rosuvastatin (CRESTOR) 10 MG tablet Take 1 tablet by mouth daily Yes Lindsey Chicas MD   losartan (COZAAR) 50 MG tablet Take 1 tablet by mouth daily Yes Lindsey Chicas MD   aspirin 81 MG chewable tablet Take 1 tablet by mouth daily Yes Lindsey Chicas MD   FLUoxetine (PROZAC) 40 MG capsule Take 40 mg by mouth daily. Yes Historical Provider, MD       [x] Medications and dosages reviewed. ROS:  [x]Full ROS obtained and negative except as mentioned in HPI      Physical Examination:    Vitals:    04/21/21 1117   BP: 120/70   Site: Right Upper Arm   Position: Sitting   Cuff Size: Medium Adult   Pulse: 88   Resp: 18   SpO2: 98%   Weight: 148 lb 14.4 oz (67.5 kg)   Height: 5' 8\" (1.727 m)        · GENERAL: Well developed, well nourished, No acute distress  · NEUROLOGICAL: Alert and oriented  · PSYCH: Anxious affect  · SKIN: Warm and dry, No visible rash,   · EYES: Pupils equal and round, Sclera non-icteric,   · HENT:  External ears and nose unremarkable, mucus membranes moist  · MUSCULOSKELETAL: Normal cephalic, neck supple  · CAROTID: Normal upstroke, no bruits  · CARDIAC: JVP normal, Normal PMI, Regular rate and rhythm, normal S1S2, No murmur, rub, or gallop  · RESPIRATORY: Normal respiratory effort, clear to auscultation bilaterally  · EXTREMITIES: No LE edema  · GASTROINTESTINAL: normal bowel sounds, soft, non-tender, No hepatomegaly     Testing below reviewed  ECHO 10/2020     Summary   -Normal left ventricle size, wall thickness and systolic function with an   estimated ejection fraction of 55-60%. -No regional wall motion abnormalities are seen.   -Normal diastolic function. E/e' = 9.1.   -The right ventricle appears mildly dilated. -The right atrium is mildly dilated. -Mild mitral regurgitation.   -Mild tricuspid regurgitation.   -Estimated pulmonary artery systolic pressure is elevated at 42 mmHg   assuming a right atrial pressure of 3 mmHg    EKG 10/5/2020  NSR, normal    MRA Neck 2014  IMPRESSION:        Moderate atherosclerotic change involving the right internal   carotid artery just beyond the origin with narrowing estimated up   to 50%.     Mild focal aneurysmal dilation of the left subclavian artery   distal to the vertebral artery. Assessment:     CAD:  Stable. Continue medical management  Inferior MI with arrest 12/2018. PCI RCA- Non obstructive other vessels. ECHO 12/2018 EF=60% with inferior HK    HTN:  /70 (Site: Right Upper Arm, Position: Sitting, Cuff Size: Medium Adult)   Pulse 88   Resp 18   Ht 5' 8\" (1.727 m)   Wt 148 lb 14.4 oz (67.5 kg)   SpO2 98%   BMI 22.64 kg/m²   Well controlled. Continue current medications. Hypelipidemia:  He says he is intolerant to statins. Neck and back pain  Retry crestor 10  LDL now 76. Continue crestor    Smoking:  Says he quit with MI  Stable    Depression:  Stable.  Worsens with lopressor  Unchanged    Dizziness:  Etiology unclear  Sounds like vertigo  Monitor normal, BP normal  He will see ENT  Also check Carotids as some stenosis in the past    Plan:  Same meds  ENT  Check carotid doppler  F/u 6 months    Thank you for allowing me to participate in the care of this individual.      Selma Olivas M.D., [facility] [place]

## 2021-04-27 ENCOUNTER — HOSPITAL ENCOUNTER (OUTPATIENT)
Dept: VASCULAR LAB | Age: 61
Discharge: HOME | End: 2021-04-27
Payer: MEDICARE

## 2021-04-27 DIAGNOSIS — I65.21 STENOSIS OF RIGHT CAROTID ARTERY: ICD-10-CM

## 2021-04-27 PROCEDURE — 93880 EXTRACRANIAL BILAT STUDY: CPT

## 2021-04-28 ENCOUNTER — TELEPHONE (OUTPATIENT)
Dept: CARDIOLOGY CLINIC | Age: 61
End: 2021-04-28

## 2021-06-12 ENCOUNTER — APPOINTMENT (OUTPATIENT)
Dept: GENERAL RADIOLOGY | Age: 61
End: 2021-06-12
Payer: MEDICARE

## 2021-06-12 ENCOUNTER — HOSPITAL ENCOUNTER (EMERGENCY)
Age: 61
Discharge: HOME | End: 2021-06-12
Payer: MEDICARE

## 2021-06-12 VITALS
DIASTOLIC BLOOD PRESSURE: 81 MMHG | SYSTOLIC BLOOD PRESSURE: 140 MMHG | RESPIRATION RATE: 18 BRPM | OXYGEN SATURATION: 99 % | BODY MASS INDEX: 24.41 KG/M2 | TEMPERATURE: 98 F | HEART RATE: 80 BPM | HEIGHT: 66 IN | WEIGHT: 151.9 LBS
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
## 2021-06-12 DIAGNOSIS — S81.851A DOG BITE OF RIGHT LOWER LEG, INITIAL ENCOUNTER: ICD-10-CM

## 2021-06-12 DIAGNOSIS — S61.452A DOG BITE OF HAND, LEFT, INITIAL ENCOUNTER: Primary | ICD-10-CM
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
## 2021-06-12 PROCEDURE — 90715 TDAP VACCINE 7 YRS/> IM: CPT | Performed by: NURSE PRACTITIONER

## 2021-06-12 PROCEDURE — 90471 IMMUNIZATION ADMIN: CPT | Performed by: NURSE PRACTITIONER

## 2021-06-12 PROCEDURE — 6370000000 HC RX 637 (ALT 250 FOR IP): Performed by: NURSE PRACTITIONER

## 2021-06-12 PROCEDURE — 6360000002 HC RX W HCPCS: Performed by: NURSE PRACTITIONER

## 2021-06-12 PROCEDURE — 73130 X-RAY EXAM OF HAND: CPT

## 2021-06-12 PROCEDURE — 99284 EMERGENCY DEPT VISIT MOD MDM: CPT

## 2021-06-12 PROCEDURE — 73590 X-RAY EXAM OF LOWER LEG: CPT

## 2021-06-12 RX ORDER — AMOXICILLIN AND CLAVULANATE POTASSIUM 875; 125 MG/1; MG/1
1 TABLET, FILM COATED ORAL 2 TIMES DAILY
Qty: 20 TABLET | Refills: 0 | Status: SHIPPED | OUTPATIENT
Start: 2021-06-12 | End: 2021-06-22

## 2021-06-12 RX ORDER — IBUPROFEN 600 MG/1
600 TABLET ORAL ONCE
Status: COMPLETED | OUTPATIENT
Start: 2021-06-12 | End: 2021-06-12

## 2021-06-12 RX ADMIN — IBUPROFEN 600 MG: 600 TABLET, FILM COATED ORAL at 21:06

## 2021-06-12 RX ADMIN — TETANUS TOXOID, REDUCED DIPHTHERIA TOXOID AND ACELLULAR PERTUSSIS VACCINE, ADSORBED 0.5 ML: 5; 2.5; 8; 8; 2.5 SUSPENSION INTRAMUSCULAR at 21:07

## 2021-06-12 ASSESSMENT — PAIN SCALES - GENERAL
PAINLEVEL_OUTOF10: 4
PAINLEVEL_OUTOF10: 4

## 2021-06-12 ASSESSMENT — PAIN DESCRIPTION - DESCRIPTORS: DESCRIPTORS: CONSTANT

## 2021-06-12 ASSESSMENT — PAIN DESCRIPTION - ONSET: ONSET: SUDDEN

## 2021-06-12 ASSESSMENT — PAIN DESCRIPTION - PROGRESSION: CLINICAL_PROGRESSION: NOT CHANGED

## 2021-06-12 ASSESSMENT — PAIN DESCRIPTION - PAIN TYPE: TYPE: ACUTE PAIN

## 2021-06-12 ASSESSMENT — PAIN - FUNCTIONAL ASSESSMENT: PAIN_FUNCTIONAL_ASSESSMENT: ACTIVITIES ARE NOT PREVENTED

## 2021-06-12 ASSESSMENT — PAIN DESCRIPTION - FREQUENCY: FREQUENCY: CONTINUOUS

## 2021-06-12 ASSESSMENT — PAIN DESCRIPTION - ORIENTATION: ORIENTATION: LEFT

## 2021-06-12 ASSESSMENT — PAIN DESCRIPTION - LOCATION: LOCATION: HAND;LEG

## 2021-06-13 NOTE — ED PROVIDER NOTES
Except as noted above in the ROS, all other systems were reviewed and negative. PAST MEDICAL HISTORY     Past Medical History:   Diagnosis Date    Cerebral artery occlusion with cerebral infarction ([address]     Croup in pediatric patient     Hyperlipidemia     Hypertension          SURGICAL HISTORY     Past Surgical History:   Procedure Laterality Date    CARDIAC SURGERY      HERNIA REPAIR           CURRENTMEDICATIONS       Previous Medications    ASPIRIN 81 MG CHEWABLE TABLET    Take 1 tablet by mouth daily    CIPROFLOXACIN (CIPRO) 500 MG TABLET    TAKE 1 TABLET BY MOUTH TWICE DAILY    FLUOXETINE (PROZAC) 40 MG CAPSULE    Take 40 mg by mouth daily. LOSARTAN (COZAAR) 50 MG TABLET    Take 1 tablet by mouth daily    ROSUVASTATIN (CRESTOR) 10 MG TABLET    Take 1 tablet by mouth daily    TIZANIDINE (ZANAFLEX) 2 MG TABLET    TAKE 1 TABLET BY MOUTH EVERY 8 HOURS AS NEEDED    TRIAMTERENE-HYDROCHLOROTHIAZIDE (MAXZIDE-25) 37.5-25 MG PER TABLET    TAKE 1 TABLET BY MOUTH DAILY         ALLERGIES     Lipitor [atorvastatin], Pentazocine, Sulfa antibiotics, Sulfur, and Viagra [sildenafil citrate]    FAMILYHISTORY       Family History   Problem Relation Age of Onset    Cancer Mother     Heart Disease Father           SOCIAL HISTORY       Social History     Tobacco Use    Smoking status: Former Smoker     Packs/day: 1.00     Types: Cigarettes     Quit date: 2018     Years since quittin.4    Smokeless tobacco: Never Used    Tobacco comment: using electronic cigarette as of 12   Vaping Use    Vaping Use: Former   Substance Use Topics    Alcohol use:  Yes     Alcohol/week: 4.0 standard drinks     Types: 2 Glasses of wine, 2 Cans of beer per week     Comment: daily 2 beers    Drug use: Never       SCREENINGS             PHYSICAL EXAM    (up to 7 for level 4, 8 or more for level 5)     ED Triage Vitals [21]   Enc Vitals Group      BP (!) 152/75      Pulse 77      Resp 17      Temp 97.2 °F (36.2 °C)      Temp Source Infrared      SpO2 98 %      Weight 151 lb 14.4 oz (68.9 kg)      Height 5' 6\" (1.676 m)       Physical Exam  Vitals and nursing note reviewed. Constitutional:       Appearance: He is well-developed. HENT:      Head: Normocephalic and atraumatic. Nose: Nose normal.   Eyes:      General:         Right eye: No discharge. Left eye: No discharge. Cardiovascular:      Pulses:           Radial pulses are 2+ on the right side and 2+ on the left side. Dorsalis pedis pulses are 2+ on the right side and 2+ on the left side. Pulmonary:      Effort: Pulmonary effort is normal. No respiratory distress. Musculoskeletal:         General: Normal range of motion. Right hand: Swelling, laceration and tenderness present. No deformity or bony tenderness. Normal range of motion. Normal strength. Normal sensation. There is no disruption of two-point discrimination. Normal capillary refill. Normal pulse. Arms:       Cervical back: Normal range of motion. Right knee: Normal.      Right lower leg: Laceration and tenderness present. No deformity. Right ankle: Normal.        Legs:    Skin:     General: Skin is warm and dry. Coloration: Skin is not pale. Neurological:      Mental Status: He is alert and oriented to person, place, and time. Psychiatric:         Behavior: Behavior normal.                     DIAGNOSTIC RESULTS   LABS:    Labs Reviewed - No data to display    All other labs were within normal range or not returned as of this dictation. EKG: All EKG's are interpreted by the Emergency Department Physician in the absence of a cardiologist.  Please see their note for interpretation of EKG.     RADIOLOGY:   Non-plain film images such as CT, Ultrasound and MRI are read by the radiologist. Plain radiographic images are visualized and preliminarily interpreted by the ED Provider with the below findings:        Interpretation per the Radiologist below, if available at the time of this note:    XR HAND LEFT (MIN 3 VIEWS)   Final Result   Take radiodensity in the distal radioulnar joint is nonspecific. Otherwise no radiopaque foreign body. No acute osseous abnormality. XR TIBIA FIBULA RIGHT (2 VIEWS)   Final Result   Subcutaneous air and edema in the soft tissues along the mid calf extending   posteriorly which is probably due to recent penetrating trauma with no   foreign body seen in the area. Mild compression deformity along the medial tibial plateau which appears   chronic with no obvious acute bony abnormality. Recommend clinical follow-up           No results found. PROCEDURES   Unless otherwise noted below, none     Procedures    CRITICAL CARE TIME   N/A    CONSULTS:  None      EMERGENCY DEPARTMENT COURSE and DIFFERENTIAL DIAGNOSIS/MDM:   Vitals:    Vitals:    06/12/21 2044   BP: (!) 152/75   Pulse: 77   Resp: 17   Temp: 97.2 °F (36.2 °C)   TempSrc: Infrared   SpO2: 98%   Weight: 151 lb 14.4 oz (68.9 kg)   Height: 5' 6\" (1.676 m)       Patient was given the following medications:  Medications   Tetanus-Diphth-Acell Pertussis (BOOSTRIX) injection 0.5 mL (0.5 mLs Intramuscular Given 6/12/21 2107)   ibuprofen (ADVIL;MOTRIN) tablet 600 mg (600 mg Oral Given 6/12/21 2106)           Pertinent Labs & Imaging studies reviewed. (See chart for details)   -  Patient seen and evaluated in the emergency department. -  Triage and nursing notes reviewed and incorporated. -  Old chart records reviewed and incorporated.   -  Patient case discussed with attending physician, although Dr. Camila Andrews was available for consultation.   -  Differential diagnosis includes:  Retained foreign body, laceration, contusion, puncture wound, abscess, cellulitis, abrasion, rabies, tetanus, versus COVID-19  -  Work-up included:  See above x-ray left hand, x-ray right tib-fib  -  ED treatment included:  Wound care, Tdap, Motrin  - Consults: None  -

## 2021-07-29 DIAGNOSIS — E78.2 MIXED HYPERLIPIDEMIA: ICD-10-CM

## 2021-07-30 RX ORDER — ROSUVASTATIN CALCIUM 10 MG/1
10 TABLET, COATED ORAL DAILY
Qty: 90 TABLET | Refills: 1 | Status: SHIPPED | OUTPATIENT
Start: 2021-07-30 | End: 2021-10-18 | Stop reason: SDUPTHER

## 2021-10-18 ENCOUNTER — OFFICE VISIT (OUTPATIENT)
Dept: CARDIOLOGY CLINIC | Age: 61
End: 2021-10-18
Payer: MEDICARE

## 2021-10-18 VITALS
HEART RATE: 81 BPM | SYSTOLIC BLOOD PRESSURE: 130 MMHG | DIASTOLIC BLOOD PRESSURE: 70 MMHG | BODY MASS INDEX: 22.67 KG/M2 | HEIGHT: 68 IN | WEIGHT: 149.6 LBS

## 2021-10-18 DIAGNOSIS — I25.83 CORONARY ARTERY DISEASE DUE TO LIPID RICH PLAQUE: Primary | ICD-10-CM

## 2021-10-18 DIAGNOSIS — I10 ESSENTIAL HYPERTENSION: ICD-10-CM

## 2021-10-18 DIAGNOSIS — I25.10 CORONARY ARTERY DISEASE DUE TO LIPID RICH PLAQUE: Primary | ICD-10-CM

## 2021-10-18 DIAGNOSIS — E78.2 MIXED HYPERLIPIDEMIA: ICD-10-CM

## 2021-10-18 DIAGNOSIS — I65.23 OCCLUSION AND STENOSIS OF BILATERAL CAROTID ARTERIES: ICD-10-CM

## 2021-10-18 DIAGNOSIS — R42 DIZZINESS: ICD-10-CM

## 2021-10-18 DIAGNOSIS — F17.200 SMOKING: ICD-10-CM

## 2021-10-18 PROCEDURE — 99214 OFFICE O/P EST MOD 30 MIN: CPT | Performed by: INTERNAL MEDICINE

## 2021-10-18 RX ORDER — ROSUVASTATIN CALCIUM 10 MG/1
10 TABLET, COATED ORAL DAILY
Qty: 90 TABLET | Refills: 4 | Status: SHIPPED | OUTPATIENT
Start: 2021-10-18 | End: 2022-06-20 | Stop reason: SDUPTHER

## 2021-10-18 RX ORDER — AMLODIPINE BESYLATE 5 MG/1
TABLET ORAL
COMMUNITY
Start: 2021-10-14

## 2021-10-18 NOTE — PATIENT INSTRUCTIONS
If you start having chest pain,shortness of breath, decreased exercise tolerance call office and we would likely set up stress test  Fasting labs soon  Carotid ultrasound in May  Follow up in [place]

## 2021-10-18 NOTE — PROGRESS NOTES
[address]  Cardiac Consult     Referring Provider:  Gwendolyn Martinez MD     Chief Complaint   Patient presents with    Follow-up    Coronary Artery Disease        History of Present Illness:  59 y.o. male with cardiac arrest with inferior MI 2018 who presents for f/u. PCI on RCA with non obstructive disease of other vessels. GXT non-diagnostic due to HR. He is doing well from a cardiac standpoint. No chest pain or dyspnea. Occassional vertigo and planning on seeing ENT. No longer smokes. Past Medical History:   has a past medical history of Cerebral artery occlusion with cerebral infarction [facility]), Croup in pediatric patient, Hyperlipidemia, and Hypertension. Surgical History:   has a past surgical history that includes hernia repair and Cardiac surgery. Social History:  Social History     Tobacco Use    Smoking status: Former Smoker     Packs/day: 1.00     Types: Cigarettes     Quit date: 2018     Years since quittin.8    Smokeless tobacco: Never Used    Tobacco comment: using electronic cigarette as of 12   Substance Use Topics    Alcohol use: Yes     Alcohol/week: 4.0 standard drinks     Types: 2 Glasses of wine, 2 Cans of beer per week     Comment: daily 2 beers        Family History:  family history includes Cancer in his mother; Heart Disease in his father. Allergies:  Lipitor [atorvastatin], Pentazocine, Sulfa antibiotics, Sulfur, and Viagra [sildenafil citrate]     Home Medications:  Prior to Visit Medications    Medication Sig Taking?  Authorizing Provider   amLODIPine (NORVASC) 5 MG tablet  Yes Historical Provider, MD   rosuvastatin (CRESTOR) 10 MG tablet TAKE 1 TABLET BY MOUTH DAILY Yes Sonny Alexander MD   triamterene-hydroCHLOROthiazide (MAXZIDE-25) 37.5-25 MG per tablet TAKE 1 TABLET BY MOUTH DAILY Yes Historical Provider, MD   aspirin 81 MG chewable tablet Take 1 tablet by mouth daily Yes Sonny Alexander MD   FLUoxetine (PROZAC) 40 MG capsule Take 40 mg by mouth daily. Yes Historical Provider, MD       [x] Medications and dosages reviewed. ROS:  [x]Full ROS obtained and negative except as mentioned in HPI      Physical Examination:    Vitals:    10/18/21 1131   BP: 130/70   Site: Left Upper Arm   Position: Sitting   Cuff Size: Large Adult   Pulse: 81   Weight: 149 lb 9.6 oz (67.9 kg)   Height: 5' 8\" (1.727 m)   PF: 99 L/min        · GENERAL: Well developed, well nourished, No acute distress  · NEUROLOGICAL: Alert and oriented  · PSYCH: Anxious affect  · SKIN: Warm and dry, No visible rash,   · EYES: Pupils equal and round, Sclera non-icteric,   · HENT:  External ears and nose unremarkable, mucus membranes moist  · MUSCULOSKELETAL: Normal cephalic, neck supple  · CAROTID: Normal upstroke, no bruits  · CARDIAC: JVP normal, Normal PMI, Regular rate and rhythm, normal S1S2, no murmur, rub, or gallop  · RESPIRATORY: Normal respiratory effort, clear to auscultation bilaterally  · EXTREMITIES: No LE edema  · GASTROINTESTINAL: normal bowel sounds, soft, non-tender, No hepatomegaly       ECHO 10/2020     Summary   -Normal left ventricle size, wall thickness and systolic function with an   estimated ejection fraction of 55-60%. -No regional wall motion abnormalities are seen.   -Normal diastolic function. E/e' = 9.1.   -The right ventricle appears mildly dilated. -The right atrium is mildly dilated. -Mild mitral regurgitation.   -Mild tricuspid regurgitation.   -Estimated pulmonary artery systolic pressure is elevated at 42 mmHg   assuming a right atrial pressure of 3 mmHg    EKG 10/5/2020  NSR, normal    MRA Neck 2014  IMPRESSION:        Moderate atherosclerotic change involving the right internal   carotid artery just beyond the origin with narrowing estimated up   to 50%.     Mild focal aneurysmal dilation of the left subclavian artery   distal to the vertebral artery.      Carotid doppler 4/2021  -The right internal carotid artery appears to have a <50% diameter reducing    stenosis based on velocity criteria.    -The left internal carotid artery appears to have a <50% diameter reducing    stenosis based on velocity criteria.    -Dilatation of the left subclavian artery. Assessment:     CAD:  Stable. Continue medical management  Inferior MI with arrest 12/2018. PCI RCA- Non obstructive other vessels. ECHO 12/2018 EF=60% with inferior HK  GXT: 1/2019-Non-diagnostic due to failure to reach target HR  Follow for symptoms-O/w likely stress ECHO after 5 years- 2023    HTN:  /70 (Site: Left Upper Arm, Position: Sitting, Cuff Size: Large Adult)   Pulse 81   Ht 5' 8\" (1.727 m)   Wt 149 lb 9.6 oz (67.9 kg)   PF 99 L/min   BMI 22.75 kg/m²   Well controlled. Continue norvasc and Maxide    Hypelipidemia:  Intolerant to statins in the past  LDL now 76. Continue crestor    Smoking:  Says he quit with MI  Stable    Depression:  Stable.  Worsens with lopressor  Unchanged    Dizziness/Vertigo:  Needs to see ENT    Carotid Stenosis:  <50% bilateral 4/2021  Repeat 5/2022    Plan:  Continue current therapy  Check labs  Carotid U/S 5/2022  F/u 6/2022    Thank you for allowing me to participate in the care of this individual.      Boris Hernandez M.D., [facility] [place]

## 2022-05-09 ENCOUNTER — HOSPITAL ENCOUNTER (OUTPATIENT)
Dept: VASCULAR LAB | Age: 62
Discharge: HOME | End: 2022-05-09
Payer: MEDICARE

## 2022-05-09 DIAGNOSIS — I65.23 OCCLUSION AND STENOSIS OF BILATERAL CAROTID ARTERIES: ICD-10-CM

## 2022-05-09 PROCEDURE — 93880 EXTRACRANIAL BILAT STUDY: CPT

## 2022-05-16 ENCOUNTER — TELEPHONE (OUTPATIENT)
Dept: CARDIOLOGY CLINIC | Age: 62
End: 2022-05-16

## 2022-05-16 NOTE — TELEPHONE ENCOUNTER
----- Message from Karan Ramos MD sent at 5/16/2022 12:55 PM EDT -----  No significant carotid disease. F/u as planned.     [facility]

## 2022-05-16 NOTE — TELEPHONE ENCOUNTER
Spoke with the patient and advised him of the results below per mmk. Patient voiced understanding .  Call complete

## 2022-05-24 NOTE — ED PROVIDER NOTES
What Type Of Note Output Would You Prefer (Optional)?: Bullet Format On A Scale Of 0 To 10 How Would You Rate Your Itching?: 8 How Did Your Itching Occur?: sudden in onset (over a period of weeks to a few months) alert but seems a little bit confused and still complains of chest pain. Call interventional cardiology immediately and I spoke with the cardiologist.  A text him a and he instructed me to start heparin and team would be brought in. They came in and took the patient to the lab. He recommended hemodynamically stable while here  FINAL IMPRESSION    1. Myocardial infarction  2.      Electronically signed by     Cassius Martinez MD  12/26/18 1791 How Severe Is Your Itching?: mild

## 2022-06-20 ENCOUNTER — OFFICE VISIT (OUTPATIENT)
Dept: CARDIOLOGY CLINIC | Age: 62
End: 2022-06-20
Payer: MEDICARE

## 2022-06-20 VITALS
OXYGEN SATURATION: 99 % | WEIGHT: 147 LBS | BODY MASS INDEX: 22.28 KG/M2 | DIASTOLIC BLOOD PRESSURE: 70 MMHG | SYSTOLIC BLOOD PRESSURE: 136 MMHG | HEIGHT: 68 IN | HEART RATE: 80 BPM

## 2022-06-20 DIAGNOSIS — I10 ESSENTIAL HYPERTENSION: ICD-10-CM

## 2022-06-20 DIAGNOSIS — I65.23 OCCLUSION AND STENOSIS OF BILATERAL CAROTID ARTERIES: ICD-10-CM

## 2022-06-20 DIAGNOSIS — I25.10 CORONARY ARTERY DISEASE DUE TO LIPID RICH PLAQUE: Primary | ICD-10-CM

## 2022-06-20 DIAGNOSIS — E78.2 MIXED HYPERLIPIDEMIA: ICD-10-CM

## 2022-06-20 DIAGNOSIS — Z13.6 SCREENING FOR AAA (ABDOMINAL AORTIC ANEURYSM): ICD-10-CM

## 2022-06-20 DIAGNOSIS — I25.83 CORONARY ARTERY DISEASE DUE TO LIPID RICH PLAQUE: Primary | ICD-10-CM

## 2022-06-20 DIAGNOSIS — R42 DIZZINESS: ICD-10-CM

## 2022-06-20 PROCEDURE — 99214 OFFICE O/P EST MOD 30 MIN: CPT | Performed by: INTERNAL MEDICINE

## 2022-06-20 RX ORDER — ROSUVASTATIN CALCIUM 20 MG/1
20 TABLET, COATED ORAL DAILY
Qty: 90 TABLET | Refills: 4 | Status: SHIPPED | OUTPATIENT
Start: 2022-06-20

## 2022-06-20 NOTE — PATIENT INSTRUCTIONS
Increase Rosuvastatin to 20 mg daily  Fasting labs in 3 months  Follow up in 1 year with a stress echo the same day  Get abdominal ultrasound 1-2 weeks prior to appt at [facility]

## 2022-06-20 NOTE — PROGRESS NOTES
[address]  Cardiac Consult     Referring Provider:  Klever Floyd MD     Chief Complaint   Patient presents with    Coronary Artery Disease    Hypertension    Hyperlipidemia        History of Present Illness:  57 y.o. male with cardiac arrest with inferior MI 12/2018 who presents for f/u. PCI on RCA with non obstructive disease of other vessels. GXT non-diagnostic due to HR. He is doing well. No chest pain. Mild chronic dyspnea with exertion. Past Medical History:   has a past medical history of Cerebral artery occlusion with cerebral infarction [facility]), Croup in pediatric patient, Hyperlipidemia, and Hypertension. Surgical History:   has a past surgical history that includes hernia repair and Cardiac surgery. Social History:  Social History     Tobacco Use    Smoking status: Former Smoker     Packs/day: 1.00     Types: Cigarettes     Quit date: 12/22/2018     Years since quitting: 3.4    Smokeless tobacco: Never Used    Tobacco comment: using electronic cigarette as of 12/17/14   Substance Use Topics    Alcohol use: Yes     Alcohol/week: 4.0 standard drinks     Types: 2 Glasses of wine, 2 Cans of beer per week     Comment: daily 2 beers        Family History:  family history includes Cancer in his mother; Heart Disease in his father. Allergies:  Elemental sulfur, Lipitor [atorvastatin], Pentazocine, Sulfa antibiotics, and Viagra [sildenafil citrate]     Home Medications:  Prior to Visit Medications    Medication Sig Taking? Authorizing Provider   amLODIPine (NORVASC) 5 MG tablet  Yes Historical Provider, MD   rosuvastatin (CRESTOR) 10 MG tablet Take 1 tablet by mouth daily Yes Andree Epstein MD   triamterene-hydroCHLOROthiazide (MAXZIDE-25) 37.5-25 MG per tablet TAKE 1 TABLET BY MOUTH DAILY Yes Historical Provider, MD   aspirin 81 MG chewable tablet Take 1 tablet by mouth daily Yes Andree Epstein MD   FLUoxetine (PROZAC) 40 MG capsule Take 40 mg by mouth daily.  Yes Historical Provider, MD       [x] Medications and dosages reviewed. ROS:  [x]Full ROS obtained and negative except as mentioned in HPI      Physical Examination:    Vitals:    06/20/22 1136   BP: 136/70   Site: Right Upper Arm   Position: Sitting   Cuff Size: Medium Adult   Pulse: 80   SpO2: 99%   Weight: 147 lb (66.7 kg)   Height: 5' 8\" (1.727 m)        · GENERAL: Well developed, well nourished, No acute distress  · NEUROLOGICAL: Alert and oriented  · PSYCH: Anxious affect  · SKIN: Warm and dry, No visible rash,   · EYES: Pupils equal and round, Sclera non-icteric,   · HENT:  External ears and nose unremarkable, mucus membranes moist  · MUSCULOSKELETAL: Normal cephalic, neck supple  · CAROTID: Normal upstroke, no bruits  · CARDIAC: JVP normal, Normal PMI, Regular rate and rhythm, normal S1S2, no murmur, rub, or gallop  · RESPIRATORY: Normal respiratory effort, clear to auscultation bilaterally  · EXTREMITIES: No LE edema  · GASTROINTESTINAL: normal bowel sounds, soft, non-tender, No hepatomegaly       ECHO 10/2020     Summary   -Normal left ventricle size, wall thickness and systolic function with an   estimated ejection fraction of 55-60%. -No regional wall motion abnormalities are seen.   -Normal diastolic function. E/e' = 9.1.   -The right ventricle appears mildly dilated. -The right atrium is mildly dilated. -Mild mitral regurgitation.   -Mild tricuspid regurgitation.   -Estimated pulmonary artery systolic pressure is elevated at 42 mmHg   assuming a right atrial pressure of 3 mmHg      MRA Neck 2014  IMPRESSION:        Moderate atherosclerotic change involving the right internal   carotid artery just beyond the origin with narrowing estimated up   to 50%.     Mild focal aneurysmal dilation of the left subclavian artery   distal to the vertebral artery. Carotid doppler 5/2022  < 50% bilateral      Assessment:     CAD:  Stable. Mild TAVARES  F/u 1 yr with stress ECHO   Inferior MI with arrest 12/2018. PCI RCA- Non obstructive other vessels. ECHO 12/2018 EF=60% with inferior HK  GXT: 1/2019-Non-diagnostic due to failure to reach target HR      HTN:  /70 (Site: Right Upper Arm, Position: Sitting, Cuff Size: Medium Adult)   Pulse 80   Ht 5' 8\" (1.727 m)   Wt 147 lb (66.7 kg)   SpO2 99%   BMI 22.35 kg/m²   Well controlled. Continue norvasc and Maxide    Hypelipidemia:  Intolerant to statins in the past  Tolerating crestor 10  TIU=356  Increase crestor to 20  Check lipids 3 months     Smoking:  Says he quit with MI  Stable    Depression:  Stable.  Worsens with lopressor  Unchanged    Dizziness/Vertigo:  Improved  Follow    Carotid Stenosis:  <50% bilateral 4/2021  Repeat 5/2022    Plan:  Increase crestor to 20  Lipids 3 months  F/u 1 year with stress ECHO AAA screen prior as will be 63 and prior smoker    Thank you for allowing me to participate in the care of this individual.      Hammad Rock M.D., [facility] [place]

## 2023-01-25 DIAGNOSIS — I25.83 CORONARY ARTERY DISEASE DUE TO LIPID RICH PLAQUE: ICD-10-CM

## 2023-01-25 DIAGNOSIS — I25.10 CORONARY ARTERY DISEASE DUE TO LIPID RICH PLAQUE: ICD-10-CM

## 2023-01-25 DIAGNOSIS — E78.2 MIXED HYPERLIPIDEMIA: ICD-10-CM

## 2023-01-25 RX ORDER — ROSUVASTATIN CALCIUM 20 MG/1
20 TABLET, COATED ORAL DAILY
Qty: 90 TABLET | Refills: 4 | Status: SHIPPED | OUTPATIENT
Start: 2023-01-25

## 2023-07-18 NOTE — CARE COORDINATION
Discharge planning-    Patient is now listed as a self-pay. Referral placed to Jamie/ Financial Counselor. Will speak with the patient, as schedule allows. Plan- Referral to Financial Counselor.     Will continue to follow for support and discharge planning.    -Joana Sims, MSW, LSW Protocol For Uva1: The patient received UVA1. Protocol For Photochemotherapy For Severe Photoresponsive Dermatoses: Tar And Broad Band Uvb (Goeckerman Treatment): The patient received Photochemotherapy for severe photoresponsive dermatoses: Tar and Broad Band UVB (Goeckerman treatment) requiring at least 4 to 8 hours of care under direct physician supervision. Protocol For Photochemotherapy For Severe Photoresponsive Dermatoses: Tar And Nbuvb (Goeckerman Treatment): The patient received Photochemotherapy for severe photoresponsive dermatoses: Tar and NBUVB (Goeckerman treatment) requiring at least 4 to 8 hours of care under direct physician supervision. Protocol: NBUVB Post-Care Instructions: I reviewed with the patient in detail post-care instructions. Patient is to wear sun protection. Patients may expect sunburn like redness, discomfort and scabbing. Detail Level: Zone Changes In Treatment Protocol: Dose remained the same Protocol For Photochemotherapy: Petrolatum And Nbuvb: The patient received Photochemotherapy: Petrolatum and NBUVB (petrolatum applied to all lesions prior to phototherapy). Protocol For Protocol For Photochemotherapy For Severe Photoresponsive Dermatoses: Bath Puva: The patient received Photochemotherapy for severe photoresponsive dermatoses: Bath PUVA requiring at least 4 to 8 hours of care under direct physician supervision. Irradiance (Optional- Include Units): 2.7 mw/cm2 Protocol For Broad Band Uvb: The patient received Broad Band UVB. Protocol For Photochemotherapy For Severe Photoresponsive Dermatoses: Petrolatum And Broad Band Uvb: The patient received Photochemotherapyfor severe photoresponsive dermatoses: Petrolatum and Broad Band UVB requiring at least 4 to 8 hours of care under direct physician supervision. Protocol For Photochemotherapy For Severe Photoresponsive Dermatoses: Petrolatum And Nbuvb: The patient received Photochemotherapy for severe photoresponsive dermatoses: Petrolatum and NBUVB requiring at least 4 to 8 hours of care under direct physician supervision. Consent: Written consent obtained.  The risks were reviewed with the patient including but not limited to: burn, pigmentary changes, pain, blistering, scabbing, redness, increased risk of skin cancers, and the remote possibility of scarring. Protocol For Photochemotherapy: Tar And Broad Band Uvb (Goeckerman Treatment): The patient received Photochemotherapy: Tar and Broad Band UVB (Goeckerman treatment). Protocol For Photochemotherapy: Baby Oil And Nbuvb: The patient received Photochemotherapy: Baby Oil and NBUVB (baby oil applied to all lesions prior to phototherapy). Treatment Number: 43 Protocol For Nb Uva: The patient received NB UVA. Protocol For Nbuvb: The patient received NBUVB. Protocol For Puva: The patient received PUVA. Render Post-Care In The Note: no Protocol For Photochemotherapy: Petrolatum And Broad Band Uvb: The patient received Photochemotherapy: Petrolatum and Broad Band UVB. Protocol For Photochemotherapy: Mineral Oil And Nbuvb: The patient received Photochemotherapy: Mineral Oil and NBUVB (mineral oil applied to all lesions prior to phototherapy). Total Body Energy: 736 Protocol For Uva: The patient received UVA. Protocol For Photochemotherapy For Severe Photoresponsive Dermatoses: Puva: The patient received Photochemotherapy for severe photoresponsive dermatoses: PUVA requiring at least 4 to 8 hours of care under direct physician supervision. Protocol For Photochemotherapy: Mineral Oil And Broad Band Uvb: The patient received Photochemotherapy: Mineral Oil and Broad Band UVB. Protocol For Photochemotherapy: Triamcinolone Ointment And Nbuvb: The patient received Photochemotherapy: Triamcinolone and NBUVB (triamcinolone ointment applied to all lesions prior to phototherapy). Total Body Time: 4:33 Protocol For Photochemotherapy: Tar And Nbuvb (Goeckerman Treatment): The patient received Photochemotherapy: Tar and NBUVB (Goeckerman treatment). Comments On Previous Treatment: 0 erythema-oil Protocol For Bath Puva: The patient received Bath PUVA.

## 2024-08-19 ENCOUNTER — OFFICE VISIT (OUTPATIENT)
Dept: INTERNAL MEDICINE CLINIC | Age: 64
End: 2024-08-19
Payer: MEDICARE

## 2024-08-19 VITALS
HEART RATE: 93 BPM | HEIGHT: 68 IN | BODY MASS INDEX: 24.49 KG/M2 | WEIGHT: 161.6 LBS | SYSTOLIC BLOOD PRESSURE: 122 MMHG | DIASTOLIC BLOOD PRESSURE: 72 MMHG | OXYGEN SATURATION: 98 %

## 2024-08-19 DIAGNOSIS — H81.10 BENIGN PAROXYSMAL POSITIONAL VERTIGO, UNSPECIFIED LATERALITY: ICD-10-CM

## 2024-08-19 DIAGNOSIS — E78.2 MIXED HYPERLIPIDEMIA: Primary | ICD-10-CM

## 2024-08-19 DIAGNOSIS — K21.9 GASTROESOPHAGEAL REFLUX DISEASE WITHOUT ESOPHAGITIS: ICD-10-CM

## 2024-08-19 DIAGNOSIS — I25.83 CORONARY ARTERY DISEASE DUE TO LIPID RICH PLAQUE: ICD-10-CM

## 2024-08-19 DIAGNOSIS — F41.1 GENERALIZED ANXIETY DISORDER: ICD-10-CM

## 2024-08-19 DIAGNOSIS — R42 DIZZY: ICD-10-CM

## 2024-08-19 DIAGNOSIS — I10 ESSENTIAL HYPERTENSION: ICD-10-CM

## 2024-08-19 DIAGNOSIS — I25.10 CORONARY ARTERY DISEASE DUE TO LIPID RICH PLAQUE: ICD-10-CM

## 2024-08-19 DIAGNOSIS — Z00.00 MEDICARE ANNUAL WELLNESS VISIT, SUBSEQUENT: ICD-10-CM

## 2024-08-19 DIAGNOSIS — Z86.73 HISTORY OF EMBOLIC STROKE: ICD-10-CM

## 2024-08-19 PROBLEM — R00.2 PALPITATIONS: Status: RESOLVED | Noted: 2021-01-27 | Resolved: 2024-08-19

## 2024-08-19 PROBLEM — F17.200 SMOKING: Status: RESOLVED | Noted: 2019-01-04 | Resolved: 2024-08-19

## 2024-08-19 PROBLEM — I16.1 HYPERTENSIVE EMERGENCY: Status: RESOLVED | Noted: 2020-10-05 | Resolved: 2024-08-19

## 2024-08-19 PROBLEM — R55 SYNCOPE, NEAR: Status: RESOLVED | Noted: 2020-10-05 | Resolved: 2024-08-19

## 2024-08-19 PROBLEM — I21.3 STEMI (ST ELEVATION MYOCARDIAL INFARCTION) (HCC): Status: RESOLVED | Noted: 2018-12-26 | Resolved: 2024-08-19

## 2024-08-19 PROBLEM — E87.1 HYPONATREMIA: Status: RESOLVED | Noted: 2020-10-05 | Resolved: 2024-08-19

## 2024-08-19 PROCEDURE — 3078F DIAST BP <80 MM HG: CPT | Performed by: INTERNAL MEDICINE

## 2024-08-19 PROCEDURE — 99204 OFFICE O/P NEW MOD 45 MIN: CPT | Performed by: INTERNAL MEDICINE

## 2024-08-19 PROCEDURE — G0439 PPPS, SUBSEQ VISIT: HCPCS | Performed by: INTERNAL MEDICINE

## 2024-08-19 PROCEDURE — 3074F SYST BP LT 130 MM HG: CPT | Performed by: INTERNAL MEDICINE

## 2024-08-19 PROCEDURE — 1123F ACP DISCUSS/DSCN MKR DOCD: CPT | Performed by: INTERNAL MEDICINE

## 2024-08-19 RX ORDER — TADALAFIL 5 MG/1
5 TABLET ORAL DAILY PRN
COMMUNITY
Start: 2023-09-25

## 2024-08-19 RX ORDER — OMEPRAZOLE 40 MG/1
1 CAPSULE, DELAYED RELEASE ORAL DAILY
COMMUNITY
Start: 2024-07-15

## 2024-08-19 SDOH — ECONOMIC STABILITY: INCOME INSECURITY: HOW HARD IS IT FOR YOU TO PAY FOR THE VERY BASICS LIKE FOOD, HOUSING, MEDICAL CARE, AND HEATING?: SOMEWHAT HARD

## 2024-08-19 SDOH — ECONOMIC STABILITY: FOOD INSECURITY: WITHIN THE PAST 12 MONTHS, YOU WORRIED THAT YOUR FOOD WOULD RUN OUT BEFORE YOU GOT MONEY TO BUY MORE.: SOMETIMES TRUE

## 2024-08-19 SDOH — ECONOMIC STABILITY: FOOD INSECURITY: WITHIN THE PAST 12 MONTHS, THE FOOD YOU BOUGHT JUST DIDN'T LAST AND YOU DIDN'T HAVE MONEY TO GET MORE.: PATIENT DECLINED

## 2024-08-19 SDOH — ECONOMIC STABILITY: TRANSPORTATION INSECURITY
IN THE PAST 12 MONTHS, HAS LACK OF TRANSPORTATION KEPT YOU FROM MEETINGS, WORK, OR FROM GETTING THINGS NEEDED FOR DAILY LIVING?: NO

## 2024-08-19 ASSESSMENT — PATIENT HEALTH QUESTIONNAIRE - PHQ9
1. LITTLE INTEREST OR PLEASURE IN DOING THINGS: MORE THAN HALF THE DAYS
SUM OF ALL RESPONSES TO PHQ QUESTIONS 1-9: 9
10. IF YOU CHECKED OFF ANY PROBLEMS, HOW DIFFICULT HAVE THESE PROBLEMS MADE IT FOR YOU TO DO YOUR WORK, TAKE CARE OF THINGS AT HOME, OR GET ALONG WITH OTHER PEOPLE: NOT DIFFICULT AT ALL
9. THOUGHTS THAT YOU WOULD BE BETTER OFF DEAD, OR OF HURTING YOURSELF: NOT AT ALL
9. THOUGHTS THAT YOU WOULD BE BETTER OFF DEAD, OR OF HURTING YOURSELF: NOT AT ALL
2. FEELING DOWN, DEPRESSED OR HOPELESS: MORE THAN HALF THE DAYS
4. FEELING TIRED OR HAVING LITTLE ENERGY: MORE THAN HALF THE DAYS
1. LITTLE INTEREST OR PLEASURE IN DOING THINGS: MORE THAN HALF THE DAYS
3. TROUBLE FALLING OR STAYING ASLEEP: SEVERAL DAYS
3. TROUBLE FALLING OR STAYING ASLEEP: SEVERAL DAYS
5. POOR APPETITE OR OVEREATING: SEVERAL DAYS
SUM OF ALL RESPONSES TO PHQ QUESTIONS 1-9: 9
6. FEELING BAD ABOUT YOURSELF - OR THAT YOU ARE A FAILURE OR HAVE LET YOURSELF OR YOUR FAMILY DOWN: SEVERAL DAYS
SUM OF ALL RESPONSES TO PHQ QUESTIONS 1-9: 9
6. FEELING BAD ABOUT YOURSELF - OR THAT YOU ARE A FAILURE OR HAVE LET YOURSELF OR YOUR FAMILY DOWN: SEVERAL DAYS
5. POOR APPETITE OR OVEREATING: SEVERAL DAYS
SUM OF ALL RESPONSES TO PHQ9 QUESTIONS 1 & 2: 4
8. MOVING OR SPEAKING SO SLOWLY THAT OTHER PEOPLE COULD HAVE NOTICED. OR THE OPPOSITE - BEING SO FIDGETY OR RESTLESS THAT YOU HAVE BEEN MOVING AROUND A LOT MORE THAN USUAL: NOT AT ALL
8. MOVING OR SPEAKING SO SLOWLY THAT OTHER PEOPLE COULD HAVE NOTICED. OR THE OPPOSITE, BEING SO FIGETY OR RESTLESS THAT YOU HAVE BEEN MOVING AROUND A LOT MORE THAN USUAL: NOT AT ALL
7. TROUBLE CONCENTRATING ON THINGS, SUCH AS READING THE NEWSPAPER OR WATCHING TELEVISION: NOT AT ALL
2. FEELING DOWN, DEPRESSED OR HOPELESS: MORE THAN HALF THE DAYS
4. FEELING TIRED OR HAVING LITTLE ENERGY: MORE THAN HALF THE DAYS
7. TROUBLE CONCENTRATING ON THINGS, SUCH AS READING THE NEWSPAPER OR WATCHING TELEVISION: NOT AT ALL
10. IF YOU CHECKED OFF ANY PROBLEMS, HOW DIFFICULT HAVE THESE PROBLEMS MADE IT FOR YOU TO DO YOUR WORK, TAKE CARE OF THINGS AT HOME, OR GET ALONG WITH OTHER PEOPLE: NOT DIFFICULT AT ALL
SUM OF ALL RESPONSES TO PHQ9 QUESTIONS 1 & 2: 4

## 2024-08-19 ASSESSMENT — ENCOUNTER SYMPTOMS
BLOOD IN STOOL: 0
SHORTNESS OF BREATH: 0
CHEST TIGHTNESS: 0
SINUS PAIN: 0
SORE THROAT: 0
CONSTIPATION: 0
COLOR CHANGE: 0
VISUAL CHANGE: 0
COUGH: 0
WHEEZING: 0
SWOLLEN GLANDS: 0
ABDOMINAL PAIN: 0

## 2024-08-19 ASSESSMENT — LIFESTYLE VARIABLES
HOW OFTEN DURING THE LAST YEAR HAVE YOU FOUND THAT YOU WERE NOT ABLE TO STOP DRINKING ONCE YOU HAD STARTED: NEVER
HOW OFTEN DURING THE LAST YEAR HAVE YOU BEEN UNABLE TO REMEMBER WHAT HAPPENED THE NIGHT BEFORE BECAUSE YOU HAD BEEN DRINKING: NEVER
HOW OFTEN DURING THE LAST YEAR HAVE YOU FAILED TO DO WHAT WAS NORMALLY EXPECTED FROM YOU BECAUSE OF DRINKING: NEVER
HOW MANY STANDARD DRINKS CONTAINING ALCOHOL DO YOU HAVE ON A TYPICAL DAY: 1 OR 2
HOW OFTEN DO YOU HAVE A DRINK CONTAINING ALCOHOL: 4 OR MORE TIMES A WEEK
HAS A RELATIVE, FRIEND, DOCTOR, OR ANOTHER HEALTH PROFESSIONAL EXPRESSED CONCERN ABOUT YOUR DRINKING OR SUGGESTED YOU CUT DOWN: NO
HOW OFTEN DURING THE LAST YEAR HAVE YOU NEEDED AN ALCOHOLIC DRINK FIRST THING IN THE MORNING TO GET YOURSELF GOING AFTER A NIGHT OF HEAVY DRINKING: NEVER
HAVE YOU OR SOMEONE ELSE BEEN INJURED AS A RESULT OF YOUR DRINKING: NO

## 2024-08-19 NOTE — PATIENT INSTRUCTIONS
cholesterol. If you think you may have a problem with alcohol or drug use, talk to your doctor.   Medicines    Take your medicines exactly as prescribed. Call your doctor if you think you are having a problem with your medicine.     If your doctor recommends aspirin, take the amount directed each day. Make sure you take aspirin and not another kind of pain reliever, such as acetaminophen (Tylenol).   When should you call for help?   Call 911 if you have symptoms of a heart attack. These may include:    Chest pain or pressure, or a strange feeling in the chest.     Sweating.     Shortness of breath.     Pain, pressure, or a strange feeling in the back, neck, jaw, or upper belly or in one or both shoulders or arms.     Lightheadedness or sudden weakness.     A fast or irregular heartbeat.   After you call 911, the  may tell you to chew 1 adult-strength or 2 to 4 low-dose aspirin. Wait for an ambulance. Do not try to drive yourself.  Watch closely for changes in your health, and be sure to contact your doctor if you have any problems.  Where can you learn more?  Go to https://www.[employer]IE[employer].net/patientEd and enter F075 to learn more about \"A Healthy Heart: Care Instructions.\"  Current as of: June 24, 2023  Content Version: 14.1  © 9535-4282 [employer].   Care instructions adapted under license by [employer]. If you have questions about a medical condition or this instruction, always ask your healthcare professional. [employer] disclaims any warranty or liability for your use of this information.      Personalized Preventive Plan for Hima Carpenter - 8/19/2024  Medicare offers a range of preventive health benefits. Some of the tests and screenings are paid in full while other may be subject to a deductible, co-insurance, and/or copay.    Some of these benefits include a comprehensive review of your medical history including lifestyle, illnesses that may run in your family, and

## 2024-08-19 NOTE — PROGRESS NOTES
FLUoxetine (PROZAC) 40 MG capsule Take 1 capsule by mouth daily Yes Provider, MD Brandin   rosuvastatin (CRESTOR) 20 MG tablet Take 1 tablet by mouth daily  Liban Wesley MD   aspirin 81 MG chewable tablet Take 1 tablet by mouth daily  Patient not taking: Reported on 8/19/2024  Liban Wesley MD       [facility] (Including outside providers/suppliers regularly involved in providing care):   Patient Care Team:  Eliseo Newman MD as PCP - General (Internal Medicine)      Reviewed and updated this visit:  Tobacco  Allergies  Meds  Problems  Med Hx  Surg Hx  Soc Hx  Fam Hx

## 2024-08-22 DIAGNOSIS — E78.2 MIXED HYPERLIPIDEMIA: ICD-10-CM

## 2024-08-22 DIAGNOSIS — I10 ESSENTIAL HYPERTENSION: ICD-10-CM

## 2024-08-22 LAB
ALBUMIN SERPL-MCNC: 4.4 G/DL (ref 3.4–5)
ALBUMIN/GLOB SERPL: 2.4 {RATIO} (ref 1.1–2.2)
ALP SERPL-CCNC: 108 U/L (ref 40–129)
ALT SERPL-CCNC: 18 U/L (ref 10–40)
ANION GAP SERPL CALCULATED.3IONS-SCNC: 10 MMOL/L (ref 3–16)
AST SERPL-CCNC: 18 U/L (ref 15–37)
BASOPHILS # BLD: 0.1 K/UL (ref 0–0.2)
BASOPHILS NFR BLD: 0.7 %
BILIRUB SERPL-MCNC: 0.5 MG/DL (ref 0–1)
BUN SERPL-MCNC: 5 MG/DL (ref 7–20)
CALCIUM SERPL-MCNC: 8.7 MG/DL (ref 8.3–10.6)
CHLORIDE SERPL-SCNC: 97 MMOL/L (ref 99–110)
CHOLEST SERPL-MCNC: 219 MG/DL (ref 0–199)
CO2 SERPL-SCNC: 26 MMOL/L (ref 21–32)
CREAT SERPL-MCNC: 0.9 MG/DL (ref 0.8–1.3)
DEPRECATED RDW RBC AUTO: 13 % (ref 12.4–15.4)
EOSINOPHIL # BLD: 0.2 K/UL (ref 0–0.6)
EOSINOPHIL NFR BLD: 2.3 %
GFR SERPLBLD CREATININE-BSD FMLA CKD-EPI: >90 ML/MIN/{1.73_M2}
GLUCOSE SERPL-MCNC: 99 MG/DL (ref 70–99)
HCT VFR BLD AUTO: 42.5 % (ref 40.5–52.5)
HDLC SERPL-MCNC: 56 MG/DL (ref 40–60)
HGB BLD-MCNC: 14.5 G/DL (ref 13.5–17.5)
LDLC SERPL CALC-MCNC: 121 MG/DL
LYMPHOCYTES # BLD: 1.3 K/UL (ref 1–5.1)
LYMPHOCYTES NFR BLD: 19.3 %
MCH RBC QN AUTO: 33.9 PG (ref 26–34)
MCHC RBC AUTO-ENTMCNC: 34.2 G/DL (ref 31–36)
MCV RBC AUTO: 99 FL (ref 80–100)
MONOCYTES # BLD: 0.6 K/UL (ref 0–1.3)
MONOCYTES NFR BLD: 8.5 %
NEUTROPHILS # BLD: 4.8 K/UL (ref 1.7–7.7)
NEUTROPHILS NFR BLD: 69.2 %
PLATELET # BLD AUTO: 229 K/UL (ref 135–450)
PMV BLD AUTO: 8.6 FL (ref 5–10.5)
POTASSIUM SERPL-SCNC: 4.3 MMOL/L (ref 3.5–5.1)
PROT SERPL-MCNC: 6.2 G/DL (ref 6.4–8.2)
RBC # BLD AUTO: 4.29 M/UL (ref 4.2–5.9)
SODIUM SERPL-SCNC: 133 MMOL/L (ref 136–145)
TRIGL SERPL-MCNC: 208 MG/DL (ref 0–150)
TSH SERPL DL<=0.005 MIU/L-ACNC: 1.34 UIU/ML (ref 0.27–4.2)
VLDLC SERPL CALC-MCNC: 42 MG/DL
WBC # BLD AUTO: 6.9 K/UL (ref 4–11)

## 2024-08-23 NOTE — RESULT ENCOUNTER NOTE
Please advise patient that the results were acceptable with the exception of the cholesterol  being elevated, the patient is advised to work on aggressive lifestyle modification including different food choices as well as lowering the fat content of the diet in addition to daily exercises with a minimum of 150 minutes/week in 10-minute increments of mild to moderate exercise like brisk walking

## 2024-09-20 ENCOUNTER — HOSPITAL ENCOUNTER (OUTPATIENT)
Dept: MRI IMAGING | Age: 64
Discharge: HOME | End: 2024-09-20
Payer: MEDICARE

## 2024-09-20 DIAGNOSIS — R42 DIZZY: ICD-10-CM

## 2024-09-20 PROCEDURE — 70551 MRI BRAIN STEM W/O DYE: CPT

## 2024-09-20 PROCEDURE — 70544 MR ANGIOGRAPHY HEAD W/O DYE: CPT

## 2024-09-25 DIAGNOSIS — R42 DIZZY: Primary | ICD-10-CM

## 2024-11-27 ENCOUNTER — OFFICE VISIT (OUTPATIENT)
Dept: NEUROLOGY | Age: 64
End: 2024-11-27

## 2024-11-27 VITALS
SYSTOLIC BLOOD PRESSURE: 132 MMHG | BODY MASS INDEX: 24.4 KG/M2 | DIASTOLIC BLOOD PRESSURE: 85 MMHG | WEIGHT: 161 LBS | HEART RATE: 84 BPM | HEIGHT: 68 IN

## 2024-11-27 DIAGNOSIS — I10 ESSENTIAL HYPERTENSION: ICD-10-CM

## 2024-11-27 DIAGNOSIS — F34.1 DYSTHYMIA: ICD-10-CM

## 2024-11-27 DIAGNOSIS — H81.393 PERIPHERAL VERTIGO, BILATERAL: ICD-10-CM

## 2024-11-27 DIAGNOSIS — I67.1 CEREBRAL ANEURYSM, NONRUPTURED: Primary | ICD-10-CM

## 2024-11-27 DIAGNOSIS — R42 DIZZINESS: Primary | ICD-10-CM

## 2024-11-27 DIAGNOSIS — I67.1 CEREBRAL ANEURYSM, NONRUPTURED: ICD-10-CM

## 2024-11-27 RX ORDER — MECLIZINE HCL 12.5 MG 12.5 MG/1
12.5 TABLET ORAL 3 TIMES DAILY PRN
Qty: 20 TABLET | Refills: 0 | Status: SHIPPED | OUTPATIENT
Start: 2024-11-27 | End: 2024-12-27

## 2024-11-27 NOTE — PATIENT INSTRUCTIONS
YOU MUST CONFIRM YOUR APPOINTMENT 1 DAY PRIOR OR IT WILL BE CANCELLED!!   Our office will call you 3 times the day prior to your appointment in an attempt to confirm.  Please return our call ASAP or confirm your appt through [facility] no later than 3 pm the day before your appointment.  If we do not hear back from you by 3 pm to confirm, your appointment will be cancelled & someone will be added into that slot from our wait list.

## 2024-11-27 NOTE — PROGRESS NOTES
The patient is a 64 y.o. years old male who  was referred by Eliseo Newman MD for consultation regarding chronic dizziness.     HPI:    The patient describes chronic daily persistent dizziness for the last 5 years.  Symptoms started after he lost his wife.  He feels symptoms triggered by mental stress.  Description lightheadedness and dizziness.  No spinning sensation, severe nausea or vomiting.  No ringing in the ears or hearing impairment.  So far able to function.  Usually is mild to moderate.  Daily and persistent.  No other associated symptoms.  No recent falling or injury.  No headache, neck or back pain, weakness or numbness.  Had recent MRI of the brain and MRA back in September which showed small vessel disease, intracranial stenosis and possible cavernous left ICA aneurysm.  She does have history remote stroke in 2014.  He is on Norvasc.  He denies any use of drugs.  He does smoke.  No suicidal ideation or thoughts but he does have chronic depression.  Occasional insomnia.  Takes Prozac.  Other review of system was unremarkable.      ROS : A 10-14 system review of constitutional, cardiovascular, respiratory, GI, eyes, , ENT, musculoskeletal, endocrine, skin, SHEENT, genitourinary, psychiatric and neurologic systems was obtained and updated today and is unremarkable except as mentioned in my HPI        Exam:   Constitutional:   Vitals:    11/27/24 1418   BP: 132/85   Pulse: 84   Weight: 73 kg (161 lb)   Height: 1.727 m (5' 7.99\")       General appearance:  Normal development and appear in no acute distress.   Mental Status:   Oriented to person, place, problem, and time.    Memory: Good immediate recall.  Intact remote memory  Normal attention span and concentration.  Language: intact naming, repeating and fluency   Good fund of Knowledge. Aware of current events and vocabulary   Cranial Nerves: Pupil round regular and reactive, extraocular muscles are intact, face is symmetric, no ophthalmoplegia,